# Patient Record
Sex: FEMALE | Race: WHITE | NOT HISPANIC OR LATINO | Employment: OTHER | URBAN - METROPOLITAN AREA
[De-identification: names, ages, dates, MRNs, and addresses within clinical notes are randomized per-mention and may not be internally consistent; named-entity substitution may affect disease eponyms.]

---

## 2020-02-03 ENCOUNTER — APPOINTMENT (OUTPATIENT)
Dept: GENERAL RADIOLOGY | Facility: CLINIC | Age: 75
DRG: 287 | End: 2020-02-03
Attending: EMERGENCY MEDICINE
Payer: COMMERCIAL

## 2020-02-03 ENCOUNTER — APPOINTMENT (OUTPATIENT)
Dept: GENERAL RADIOLOGY | Facility: CLINIC | Age: 75
DRG: 287 | End: 2020-02-03
Attending: INTERNAL MEDICINE
Payer: COMMERCIAL

## 2020-02-03 ENCOUNTER — HOSPITAL ENCOUNTER (INPATIENT)
Facility: CLINIC | Age: 75
LOS: 1 days | Discharge: HOME | DRG: 287 | End: 2020-02-04
Attending: EMERGENCY MEDICINE | Admitting: INTERNAL MEDICINE
Payer: COMMERCIAL

## 2020-02-03 ENCOUNTER — TRANSFERRED RECORDS (OUTPATIENT)
Dept: HEALTH INFORMATION MANAGEMENT | Facility: CLINIC | Age: 75
End: 2020-02-03

## 2020-02-03 ENCOUNTER — APPOINTMENT (OUTPATIENT)
Dept: CARDIOLOGY | Facility: CLINIC | Age: 75
DRG: 287 | End: 2020-02-03
Attending: INTERNAL MEDICINE
Payer: COMMERCIAL

## 2020-02-03 ENCOUNTER — APPOINTMENT (OUTPATIENT)
Dept: ULTRASOUND IMAGING | Facility: CLINIC | Age: 75
DRG: 287 | End: 2020-02-03
Attending: INTERNAL MEDICINE
Payer: COMMERCIAL

## 2020-02-03 ENCOUNTER — APPOINTMENT (OUTPATIENT)
Dept: CT IMAGING | Facility: CLINIC | Age: 75
DRG: 287 | End: 2020-02-03
Attending: EMERGENCY MEDICINE
Payer: COMMERCIAL

## 2020-02-03 ENCOUNTER — SURGERY (OUTPATIENT)
Age: 75
End: 2020-02-03
Payer: COMMERCIAL

## 2020-02-03 DIAGNOSIS — R07.9 CHEST PAIN: ICD-10-CM

## 2020-02-03 DIAGNOSIS — I48.19 PERSISTENT ATRIAL FIBRILLATION WITH RVR (H): ICD-10-CM

## 2020-02-03 DIAGNOSIS — I21.3 ST ELEVATION MYOCARDIAL INFARCTION (STEMI), UNSPECIFIED ARTERY (H): ICD-10-CM

## 2020-02-03 DIAGNOSIS — I48.0 PAROXYSMAL ATRIAL FIBRILLATION (H): Primary | ICD-10-CM

## 2020-02-03 PROBLEM — I48.91 A-FIB (H): Status: ACTIVE | Noted: 2020-02-03

## 2020-02-03 LAB
ANION GAP SERPL CALCULATED.3IONS-SCNC: 13 MMOL/L (ref 3–14)
ANION GAP SERPL CALCULATED.3IONS-SCNC: 7 MMOL/L (ref 3–14)
APTT PPP: 184 SEC (ref 22–37)
APTT PPP: 46 SEC (ref 22–37)
APTT PPP: 77 SEC (ref 22–37)
BASOPHILS # BLD AUTO: 0.1 10E9/L (ref 0–0.2)
BASOPHILS NFR BLD AUTO: 0.9 %
BUN SERPL-MCNC: 16 MG/DL (ref 7–30)
BUN SERPL-MCNC: 21 MG/DL (ref 7–30)
CALCIUM SERPL-MCNC: 7.5 MG/DL (ref 8.5–10.1)
CALCIUM SERPL-MCNC: 8.6 MG/DL (ref 8.5–10.1)
CHLORIDE SERPL-SCNC: 111 MMOL/L (ref 94–109)
CHLORIDE SERPL-SCNC: 117 MMOL/L (ref 94–109)
CO2 SERPL-SCNC: 16 MMOL/L (ref 20–32)
CO2 SERPL-SCNC: 22 MMOL/L (ref 20–32)
CREAT SERPL-MCNC: 0.79 MG/DL (ref 0.52–1.04)
CREAT SERPL-MCNC: 1.13 MG/DL (ref 0.52–1.04)
DIFFERENTIAL METHOD BLD: ABNORMAL
EOSINOPHIL # BLD AUTO: 0.2 10E9/L (ref 0–0.7)
EOSINOPHIL NFR BLD AUTO: 2.5 %
ERYTHROCYTE [DISTWIDTH] IN BLOOD BY AUTOMATED COUNT: 14.8 % (ref 10–15)
ERYTHROCYTE [DISTWIDTH] IN BLOOD BY AUTOMATED COUNT: 14.9 % (ref 10–15)
ERYTHROCYTE [DISTWIDTH] IN BLOOD BY AUTOMATED COUNT: 15.1 % (ref 10–15)
GFR SERPL CREATININE-BSD FRML MDRD: 48 ML/MIN/{1.73_M2}
GFR SERPL CREATININE-BSD FRML MDRD: 73 ML/MIN/{1.73_M2}
GLUCOSE BLDC GLUCOMTR-MCNC: 116 MG/DL (ref 70–99)
GLUCOSE BLDC GLUCOMTR-MCNC: 150 MG/DL (ref 70–99)
GLUCOSE BLDC GLUCOMTR-MCNC: 84 MG/DL (ref 70–99)
GLUCOSE SERPL-MCNC: 110 MG/DL (ref 70–99)
GLUCOSE SERPL-MCNC: 333 MG/DL (ref 70–99)
HBA1C MFR BLD: 5.9 % (ref 0–5.6)
HCT VFR BLD AUTO: 40.9 % (ref 35–47)
HCT VFR BLD AUTO: 42.1 % (ref 35–47)
HCT VFR BLD AUTO: 44.3 % (ref 35–47)
HGB BLD-MCNC: 13 G/DL (ref 11.7–15.7)
HGB BLD-MCNC: 13.6 G/DL (ref 11.7–15.7)
HGB BLD-MCNC: 14.4 G/DL (ref 11.7–15.7)
IMM GRANULOCYTES # BLD: 0 10E9/L (ref 0–0.4)
IMM GRANULOCYTES NFR BLD: 0.3 %
LYMPHOCYTES # BLD AUTO: 1.7 10E9/L (ref 0.8–5.3)
LYMPHOCYTES NFR BLD AUTO: 24.8 %
MCH RBC QN AUTO: 30.4 PG (ref 26.5–33)
MCH RBC QN AUTO: 31 PG (ref 26.5–33)
MCH RBC QN AUTO: 31.2 PG (ref 26.5–33)
MCHC RBC AUTO-ENTMCNC: 31.8 G/DL (ref 31.5–36.5)
MCHC RBC AUTO-ENTMCNC: 32.3 G/DL (ref 31.5–36.5)
MCHC RBC AUTO-ENTMCNC: 32.5 G/DL (ref 31.5–36.5)
MCV RBC AUTO: 96 FL (ref 78–100)
MCV RBC AUTO: 96 FL (ref 78–100)
MCV RBC AUTO: 97 FL (ref 78–100)
MONOCYTES # BLD AUTO: 0.4 10E9/L (ref 0–1.3)
MONOCYTES NFR BLD AUTO: 6.2 %
MRSA DNA SPEC QL NAA+PROBE: NEGATIVE
NEUTROPHILS # BLD AUTO: 4.5 10E9/L (ref 1.6–8.3)
NEUTROPHILS NFR BLD AUTO: 65.3 %
NRBC # BLD AUTO: 0 10*3/UL
NRBC BLD AUTO-RTO: 0 /100
NT-PROBNP SERPL-MCNC: 872 PG/ML (ref 0–900)
PLATELET # BLD AUTO: 101 10E9/L (ref 150–450)
PLATELET # BLD AUTO: 139 10E9/L (ref 150–450)
PLATELET # BLD AUTO: 93 10E9/L (ref 150–450)
POTASSIUM SERPL-SCNC: 3.9 MMOL/L (ref 3.4–5.3)
POTASSIUM SERPL-SCNC: 4 MMOL/L (ref 3.4–5.3)
RADIOLOGIST FLAGS: ABNORMAL
RBC # BLD AUTO: 4.28 10E12/L (ref 3.8–5.2)
RBC # BLD AUTO: 4.36 10E12/L (ref 3.8–5.2)
RBC # BLD AUTO: 4.64 10E12/L (ref 3.8–5.2)
SODIUM SERPL-SCNC: 140 MMOL/L (ref 133–144)
SODIUM SERPL-SCNC: 146 MMOL/L (ref 133–144)
SPECIMEN SOURCE: NORMAL
TROPONIN I BLD-MCNC: 0 UG/L (ref 0–0.08)
TROPONIN I BLD-MCNC: 0.01 UG/L (ref 0–0.08)
TROPONIN I SERPL-MCNC: <0.015 UG/L (ref 0–0.04)
TSH SERPL DL<=0.005 MIU/L-ACNC: 2.66 MU/L (ref 0.4–4)
WBC # BLD AUTO: 10.2 10E9/L (ref 4–11)
WBC # BLD AUTO: 6.8 10E9/L (ref 4–11)
WBC # BLD AUTO: 8.3 10E9/L (ref 4–11)

## 2020-02-03 PROCEDURE — 70450 CT HEAD/BRAIN W/O DYE: CPT

## 2020-02-03 PROCEDURE — 93308 TTE F-UP OR LMTD: CPT | Mod: 26 | Performed by: INTERNAL MEDICINE

## 2020-02-03 PROCEDURE — 93308 TTE F-UP OR LMTD: CPT

## 2020-02-03 PROCEDURE — 93005 ELECTROCARDIOGRAM TRACING: CPT

## 2020-02-03 PROCEDURE — 85730 THROMBOPLASTIN TIME PARTIAL: CPT | Performed by: INTERNAL MEDICINE

## 2020-02-03 PROCEDURE — 25800030 ZZH RX IP 258 OP 636: Performed by: INTERNAL MEDICINE

## 2020-02-03 PROCEDURE — 76857 US EXAM PELVIC LIMITED: CPT

## 2020-02-03 PROCEDURE — 84484 ASSAY OF TROPONIN QUANT: CPT

## 2020-02-03 PROCEDURE — 93321 DOPPLER ECHO F-UP/LMTD STD: CPT | Mod: 26 | Performed by: INTERNAL MEDICINE

## 2020-02-03 PROCEDURE — 25000125 ZZHC RX 250: Performed by: INTERNAL MEDICINE

## 2020-02-03 PROCEDURE — 85025 COMPLETE CBC W/AUTO DIFF WBC: CPT | Performed by: EMERGENCY MEDICINE

## 2020-02-03 PROCEDURE — 80048 BASIC METABOLIC PNL TOTAL CA: CPT | Performed by: EMERGENCY MEDICINE

## 2020-02-03 PROCEDURE — 83880 ASSAY OF NATRIURETIC PEPTIDE: CPT | Performed by: EMERGENCY MEDICINE

## 2020-02-03 PROCEDURE — 94002 VENT MGMT INPAT INIT DAY: CPT

## 2020-02-03 PROCEDURE — 25000128 H RX IP 250 OP 636: Performed by: INTERNAL MEDICINE

## 2020-02-03 PROCEDURE — 25000128 H RX IP 250 OP 636: Performed by: EMERGENCY MEDICINE

## 2020-02-03 PROCEDURE — 84484 ASSAY OF TROPONIN QUANT: CPT | Performed by: EMERGENCY MEDICINE

## 2020-02-03 PROCEDURE — 4A023N7 MEASUREMENT OF CARDIAC SAMPLING AND PRESSURE, LEFT HEART, PERCUTANEOUS APPROACH: ICD-10-PCS | Performed by: INTERNAL MEDICINE

## 2020-02-03 PROCEDURE — 00000146 ZZHCL STATISTIC GLUCOSE BY METER IP

## 2020-02-03 PROCEDURE — 36620 INSERTION CATHETER ARTERY: CPT

## 2020-02-03 PROCEDURE — 80048 BASIC METABOLIC PNL TOTAL CA: CPT | Performed by: OBSTETRICS & GYNECOLOGY

## 2020-02-03 PROCEDURE — 99152 MOD SED SAME PHYS/QHP 5/>YRS: CPT | Performed by: INTERNAL MEDICINE

## 2020-02-03 PROCEDURE — 85027 COMPLETE CBC AUTOMATED: CPT | Performed by: OBSTETRICS & GYNECOLOGY

## 2020-02-03 PROCEDURE — 96365 THER/PROPH/DIAG IV INF INIT: CPT | Mod: 59

## 2020-02-03 PROCEDURE — 40000965 ZZH STATISTIC END TITIAL CO2 MONITORING

## 2020-02-03 PROCEDURE — 96376 TX/PRO/DX INJ SAME DRUG ADON: CPT

## 2020-02-03 PROCEDURE — 87641 MR-STAPH DNA AMP PROBE: CPT | Performed by: INTERNAL MEDICINE

## 2020-02-03 PROCEDURE — 25000128 H RX IP 250 OP 636

## 2020-02-03 PROCEDURE — B2111ZZ FLUOROSCOPY OF MULTIPLE CORONARY ARTERIES USING LOW OSMOLAR CONTRAST: ICD-10-PCS | Performed by: INTERNAL MEDICINE

## 2020-02-03 PROCEDURE — 87640 STAPH A DNA AMP PROBE: CPT | Performed by: INTERNAL MEDICINE

## 2020-02-03 PROCEDURE — 99292 CRITICAL CARE ADDL 30 MIN: CPT

## 2020-02-03 PROCEDURE — 84443 ASSAY THYROID STIM HORMONE: CPT | Performed by: INTERNAL MEDICINE

## 2020-02-03 PROCEDURE — 25000131 ZZH RX MED GY IP 250 OP 636 PS 637: Performed by: INTERNAL MEDICINE

## 2020-02-03 PROCEDURE — 31500 INSERT EMERGENCY AIRWAY: CPT

## 2020-02-03 PROCEDURE — 5A1945Z RESPIRATORY VENTILATION, 24-96 CONSECUTIVE HOURS: ICD-10-PCS | Performed by: EMERGENCY MEDICINE

## 2020-02-03 PROCEDURE — 40000986 XR CHEST PORT 1 VW

## 2020-02-03 PROCEDURE — 93458 L HRT ARTERY/VENTRICLE ANGIO: CPT | Mod: 26 | Performed by: INTERNAL MEDICINE

## 2020-02-03 PROCEDURE — 85027 COMPLETE CBC AUTOMATED: CPT | Performed by: INTERNAL MEDICINE

## 2020-02-03 PROCEDURE — 99291 CRITICAL CARE FIRST HOUR: CPT | Mod: 25 | Performed by: INTERNAL MEDICINE

## 2020-02-03 PROCEDURE — C1769 GUIDE WIRE: HCPCS | Performed by: INTERNAL MEDICINE

## 2020-02-03 PROCEDURE — 71045 X-RAY EXAM CHEST 1 VIEW: CPT | Mod: 76

## 2020-02-03 PROCEDURE — 99291 CRITICAL CARE FIRST HOUR: CPT

## 2020-02-03 PROCEDURE — 40000275 ZZH STATISTIC RCP TIME EA 10 MIN

## 2020-02-03 PROCEDURE — 99153 MOD SED SAME PHYS/QHP EA: CPT

## 2020-02-03 PROCEDURE — 71260 CT THORAX DX C+: CPT

## 2020-02-03 PROCEDURE — 99223 1ST HOSP IP/OBS HIGH 75: CPT | Mod: AI | Performed by: INTERNAL MEDICINE

## 2020-02-03 PROCEDURE — 40000281 ZZH STATISTIC TRANSPORT TIME EA 15 MIN

## 2020-02-03 PROCEDURE — 93458 L HRT ARTERY/VENTRICLE ANGIO: CPT | Performed by: INTERNAL MEDICINE

## 2020-02-03 PROCEDURE — 76604 US EXAM CHEST: CPT

## 2020-02-03 PROCEDURE — 20000003 ZZH R&B ICU

## 2020-02-03 PROCEDURE — 27210794 ZZH OR GENERAL SUPPLY STERILE: Performed by: INTERNAL MEDICINE

## 2020-02-03 PROCEDURE — 27210131 ZZH KIT ART LINE INSERTION

## 2020-02-03 PROCEDURE — 25000125 ZZHC RX 250: Performed by: EMERGENCY MEDICINE

## 2020-02-03 PROCEDURE — 99152 MOD SED SAME PHYS/QHP 5/>YRS: CPT

## 2020-02-03 PROCEDURE — 76705 ECHO EXAM OF ABDOMEN: CPT

## 2020-02-03 PROCEDURE — 93325 DOPPLER ECHO COLOR FLOW MAPG: CPT | Mod: 26 | Performed by: INTERNAL MEDICINE

## 2020-02-03 PROCEDURE — 93970 EXTREMITY STUDY: CPT

## 2020-02-03 PROCEDURE — 71045 X-RAY EXAM CHEST 1 VIEW: CPT

## 2020-02-03 PROCEDURE — 83036 HEMOGLOBIN GLYCOSYLATED A1C: CPT | Performed by: INTERNAL MEDICINE

## 2020-02-03 PROCEDURE — 96375 TX/PRO/DX INJ NEW DRUG ADDON: CPT

## 2020-02-03 RX ORDER — FENTANYL CITRATE 50 UG/ML
25-50 INJECTION, SOLUTION INTRAMUSCULAR; INTRAVENOUS
Status: DISPENSED | OUTPATIENT
Start: 2020-02-03 | End: 2020-02-04

## 2020-02-03 RX ORDER — FLUMAZENIL 0.1 MG/ML
0.2 INJECTION, SOLUTION INTRAVENOUS
Status: DISCONTINUED | OUTPATIENT
Start: 2020-02-03 | End: 2020-02-03

## 2020-02-03 RX ORDER — NITROGLYCERIN 5 MG/ML
VIAL (ML) INTRAVENOUS
Status: DISCONTINUED | OUTPATIENT
Start: 2020-02-03 | End: 2020-02-03 | Stop reason: HOSPADM

## 2020-02-03 RX ORDER — NALOXONE HYDROCHLORIDE 0.4 MG/ML
.1-.4 INJECTION, SOLUTION INTRAMUSCULAR; INTRAVENOUS; SUBCUTANEOUS
Status: DISCONTINUED | OUTPATIENT
Start: 2020-02-03 | End: 2020-02-04

## 2020-02-03 RX ORDER — NICOTINE POLACRILEX 4 MG
15-30 LOZENGE BUCCAL
Status: DISCONTINUED | OUTPATIENT
Start: 2020-02-03 | End: 2020-02-04 | Stop reason: HOSPADM

## 2020-02-03 RX ORDER — FENTANYL CITRATE 50 UG/ML
25 INJECTION, SOLUTION INTRAMUSCULAR; INTRAVENOUS
Status: DISCONTINUED | OUTPATIENT
Start: 2020-02-03 | End: 2020-02-03

## 2020-02-03 RX ORDER — KETAMINE HCL IN 0.9 % NACL 50 MG/5 ML
200 SYRINGE (ML) INTRAVENOUS ONCE
Status: COMPLETED | OUTPATIENT
Start: 2020-02-03 | End: 2020-02-03

## 2020-02-03 RX ORDER — FENTANYL CITRATE 50 UG/ML
INJECTION, SOLUTION INTRAMUSCULAR; INTRAVENOUS
Status: COMPLETED
Start: 2020-02-03 | End: 2020-02-03

## 2020-02-03 RX ORDER — PROPOFOL 10 MG/ML
10-20 INJECTION, EMULSION INTRAVENOUS EVERY 30 MIN PRN
Status: DISCONTINUED | OUTPATIENT
Start: 2020-02-03 | End: 2020-02-04 | Stop reason: HOSPADM

## 2020-02-03 RX ORDER — NALOXONE HYDROCHLORIDE 0.4 MG/ML
.2-.4 INJECTION, SOLUTION INTRAMUSCULAR; INTRAVENOUS; SUBCUTANEOUS
Status: ACTIVE | OUTPATIENT
Start: 2020-02-03 | End: 2020-02-04

## 2020-02-03 RX ORDER — BISACODYL 10 MG
10 SUPPOSITORY, RECTAL RECTAL DAILY PRN
Status: DISCONTINUED | OUTPATIENT
Start: 2020-02-03 | End: 2020-02-04 | Stop reason: HOSPADM

## 2020-02-03 RX ORDER — ASPIRIN 300 MG/1
300 SUPPOSITORY RECTAL ONCE
Status: DISCONTINUED | OUTPATIENT
Start: 2020-02-03 | End: 2020-02-03

## 2020-02-03 RX ORDER — ATROPINE SULFATE 0.1 MG/ML
INJECTION INTRAVENOUS
Status: DISCONTINUED | OUTPATIENT
Start: 2020-02-03 | End: 2020-02-03 | Stop reason: HOSPADM

## 2020-02-03 RX ORDER — AMOXICILLIN 250 MG
2 CAPSULE ORAL 2 TIMES DAILY PRN
Status: DISCONTINUED | OUTPATIENT
Start: 2020-02-03 | End: 2020-02-04 | Stop reason: HOSPADM

## 2020-02-03 RX ORDER — HEPARIN SODIUM 1000 [USP'U]/ML
INJECTION, SOLUTION INTRAVENOUS; SUBCUTANEOUS
Status: COMPLETED
Start: 2020-02-03 | End: 2020-02-03

## 2020-02-03 RX ORDER — LIDOCAINE HYDROCHLORIDE 10 MG/ML
INJECTION, SOLUTION EPIDURAL; INFILTRATION; INTRACAUDAL; PERINEURAL
Status: DISCONTINUED
Start: 2020-02-03 | End: 2020-02-03 | Stop reason: HOSPADM

## 2020-02-03 RX ORDER — PROCHLORPERAZINE 25 MG
12.5 SUPPOSITORY, RECTAL RECTAL EVERY 12 HOURS PRN
Status: DISCONTINUED | OUTPATIENT
Start: 2020-02-03 | End: 2020-02-04 | Stop reason: HOSPADM

## 2020-02-03 RX ORDER — IOPAMIDOL 755 MG/ML
82 INJECTION, SOLUTION INTRAVASCULAR ONCE
Status: COMPLETED | OUTPATIENT
Start: 2020-02-03 | End: 2020-02-03

## 2020-02-03 RX ORDER — HEPARIN SODIUM 1000 [USP'U]/ML
5000 INJECTION, SOLUTION INTRAVENOUS; SUBCUTANEOUS ONCE
Status: COMPLETED | OUTPATIENT
Start: 2020-02-03 | End: 2020-02-03

## 2020-02-03 RX ORDER — DEXTROSE MONOHYDRATE 25 G/50ML
25-50 INJECTION, SOLUTION INTRAVENOUS
Status: DISCONTINUED | OUTPATIENT
Start: 2020-02-03 | End: 2020-02-04 | Stop reason: HOSPADM

## 2020-02-03 RX ORDER — FENTANYL CITRATE 50 UG/ML
INJECTION, SOLUTION INTRAMUSCULAR; INTRAVENOUS
Status: DISCONTINUED
Start: 2020-02-03 | End: 2020-02-03 | Stop reason: HOSPADM

## 2020-02-03 RX ORDER — HEPARIN SODIUM 10000 [USP'U]/100ML
1050 INJECTION, SOLUTION INTRAVENOUS CONTINUOUS
Status: DISCONTINUED | OUTPATIENT
Start: 2020-02-03 | End: 2020-02-04

## 2020-02-03 RX ORDER — ACETAMINOPHEN 325 MG/1
650 TABLET ORAL EVERY 4 HOURS PRN
Status: DISCONTINUED | OUTPATIENT
Start: 2020-02-03 | End: 2020-02-04 | Stop reason: HOSPADM

## 2020-02-03 RX ORDER — ONDANSETRON 2 MG/ML
4 INJECTION INTRAMUSCULAR; INTRAVENOUS EVERY 6 HOURS PRN
Status: DISCONTINUED | OUTPATIENT
Start: 2020-02-03 | End: 2020-02-04 | Stop reason: HOSPADM

## 2020-02-03 RX ORDER — ONDANSETRON 4 MG/1
4 TABLET, ORALLY DISINTEGRATING ORAL EVERY 6 HOURS PRN
Status: DISCONTINUED | OUTPATIENT
Start: 2020-02-03 | End: 2020-02-04 | Stop reason: HOSPADM

## 2020-02-03 RX ORDER — NALOXONE HYDROCHLORIDE 0.4 MG/ML
.1-.4 INJECTION, SOLUTION INTRAMUSCULAR; INTRAVENOUS; SUBCUTANEOUS
Status: DISCONTINUED | OUTPATIENT
Start: 2020-02-03 | End: 2020-02-04 | Stop reason: HOSPADM

## 2020-02-03 RX ORDER — METOPROLOL TARTRATE 1 MG/ML
2.5 INJECTION, SOLUTION INTRAVENOUS EVERY 6 HOURS
Status: DISCONTINUED | OUTPATIENT
Start: 2020-02-03 | End: 2020-02-04 | Stop reason: HOSPADM

## 2020-02-03 RX ORDER — PHENYLEPHRINE HYDROCHLORIDE 10 MG/ML
INJECTION INTRAVENOUS
Status: DISCONTINUED | OUTPATIENT
Start: 2020-02-03 | End: 2020-02-03 | Stop reason: HOSPADM

## 2020-02-03 RX ORDER — PHENYLEPHRINE HCL IN 0.9% NACL 1 MG/10 ML
SYRINGE (ML) INTRAVENOUS
Status: DISCONTINUED
Start: 2020-02-03 | End: 2020-02-03 | Stop reason: HOSPADM

## 2020-02-03 RX ORDER — ATROPINE SULFATE 0.1 MG/ML
0.5 INJECTION INTRAVENOUS EVERY 5 MIN PRN
Status: ACTIVE | OUTPATIENT
Start: 2020-02-03 | End: 2020-02-04

## 2020-02-03 RX ORDER — DOPAMINE HYDROCHLORIDE 160 MG/100ML
INJECTION, SOLUTION INTRAVENOUS
Status: DISCONTINUED
Start: 2020-02-03 | End: 2020-02-03 | Stop reason: HOSPADM

## 2020-02-03 RX ORDER — PROPOFOL 10 MG/ML
INJECTION, EMULSION INTRAVENOUS
Status: DISCONTINUED
Start: 2020-02-03 | End: 2020-02-03

## 2020-02-03 RX ORDER — PROCHLORPERAZINE MALEATE 5 MG
5 TABLET ORAL EVERY 6 HOURS PRN
Status: DISCONTINUED | OUTPATIENT
Start: 2020-02-03 | End: 2020-02-04 | Stop reason: HOSPADM

## 2020-02-03 RX ORDER — SODIUM CHLORIDE 9 MG/ML
INJECTION, SOLUTION INTRAVENOUS CONTINUOUS
Status: DISCONTINUED | OUTPATIENT
Start: 2020-02-03 | End: 2020-02-04

## 2020-02-03 RX ORDER — AMOXICILLIN 250 MG
1 CAPSULE ORAL 2 TIMES DAILY PRN
Status: DISCONTINUED | OUTPATIENT
Start: 2020-02-03 | End: 2020-02-04 | Stop reason: HOSPADM

## 2020-02-03 RX ORDER — PHENYLEPHRINE HYDROCHLORIDE 10 MG/ML
INJECTION INTRAVENOUS
Status: DISCONTINUED
Start: 2020-02-03 | End: 2020-02-03 | Stop reason: HOSPADM

## 2020-02-03 RX ORDER — PROPOFOL 10 MG/ML
5-75 INJECTION, EMULSION INTRAVENOUS CONTINUOUS
Status: DISCONTINUED | OUTPATIENT
Start: 2020-02-03 | End: 2020-02-04 | Stop reason: HOSPADM

## 2020-02-03 RX ORDER — KETAMINE HYDROCHLORIDE 100 MG/ML
INJECTION, SOLUTION INTRAMUSCULAR; INTRAVENOUS
Status: DISCONTINUED
Start: 2020-02-03 | End: 2020-02-03 | Stop reason: WASHOUT

## 2020-02-03 RX ORDER — ONDANSETRON 2 MG/ML
INJECTION INTRAMUSCULAR; INTRAVENOUS
Status: COMPLETED
Start: 2020-02-03 | End: 2020-02-03

## 2020-02-03 RX ORDER — NITROGLYCERIN 5 MG/ML
VIAL (ML) INTRAVENOUS
Status: DISCONTINUED
Start: 2020-02-03 | End: 2020-02-03 | Stop reason: HOSPADM

## 2020-02-03 RX ORDER — FENTANYL CITRATE 50 UG/ML
INJECTION, SOLUTION INTRAMUSCULAR; INTRAVENOUS
Status: DISCONTINUED | OUTPATIENT
Start: 2020-02-03 | End: 2020-02-03 | Stop reason: HOSPADM

## 2020-02-03 RX ORDER — DOPAMINE HYDROCHLORIDE 160 MG/100ML
5-20 INJECTION, SOLUTION INTRAVENOUS CONTINUOUS
Status: DISCONTINUED | OUTPATIENT
Start: 2020-02-03 | End: 2020-02-04

## 2020-02-03 RX ORDER — FENTANYL CITRATE 50 UG/ML
50 INJECTION, SOLUTION INTRAMUSCULAR; INTRAVENOUS ONCE
Status: COMPLETED | OUTPATIENT
Start: 2020-02-03 | End: 2020-02-03

## 2020-02-03 RX ORDER — ATROPINE SULFATE 0.1 MG/ML
INJECTION INTRAVENOUS
Status: DISCONTINUED
Start: 2020-02-03 | End: 2020-02-03 | Stop reason: WASHOUT

## 2020-02-03 RX ORDER — VERAPAMIL HYDROCHLORIDE 2.5 MG/ML
INJECTION, SOLUTION INTRAVENOUS
Status: DISCONTINUED
Start: 2020-02-03 | End: 2020-02-03 | Stop reason: WASHOUT

## 2020-02-03 RX ORDER — IOPAMIDOL 755 MG/ML
INJECTION, SOLUTION INTRAVASCULAR
Status: DISCONTINUED | OUTPATIENT
Start: 2020-02-03 | End: 2020-02-03 | Stop reason: HOSPADM

## 2020-02-03 RX ADMIN — MIDAZOLAM 5 MG: 1 INJECTION INTRAMUSCULAR; INTRAVENOUS at 12:33

## 2020-02-03 RX ADMIN — DOPAMINE HYDROCHLORIDE 5 MCG/KG/MIN: 160 INJECTION, SOLUTION INTRAVENOUS at 21:52

## 2020-02-03 RX ADMIN — HEPARIN SODIUM 5000 UNITS: 1000 INJECTION INTRAVENOUS; SUBCUTANEOUS at 12:11

## 2020-02-03 RX ADMIN — EPINEPHRINE 1 MG: 0.1 INJECTION INTRACARDIAC; INTRAVENOUS at 12:22

## 2020-02-03 RX ADMIN — IOPAMIDOL 82 ML: 755 INJECTION, SOLUTION INTRAVENOUS at 11:48

## 2020-02-03 RX ADMIN — SUCCINYLCHOLINE CHLORIDE 120 MG: 20 INJECTION, SOLUTION INTRAMUSCULAR; INTRAVENOUS at 12:24

## 2020-02-03 RX ADMIN — METOPROLOL TARTRATE 2.5 MG: 5 INJECTION INTRAVENOUS at 18:19

## 2020-02-03 RX ADMIN — FENTANYL CITRATE 50 MCG: 50 INJECTION INTRAMUSCULAR; INTRAVENOUS at 12:33

## 2020-02-03 RX ADMIN — ONDANSETRON HYDROCHLORIDE 8 MG: 2 INJECTION, SOLUTION INTRAMUSCULAR; INTRAVENOUS at 11:14

## 2020-02-03 RX ADMIN — HEPARIN SODIUM 5000 UNITS: 1000 INJECTION, SOLUTION INTRAVENOUS; SUBCUTANEOUS at 12:11

## 2020-02-03 RX ADMIN — DOPAMINE HYDROCHLORIDE 5 MCG/KG/MIN: 160 INJECTION, SOLUTION INTRAVENOUS at 12:07

## 2020-02-03 RX ADMIN — HEPARIN SODIUM 1050 UNITS/HR: 10000 INJECTION, SOLUTION INTRAVENOUS at 22:16

## 2020-02-03 RX ADMIN — KETAMINE HYDROCHLORIDE 200 MG: 10 INJECTION, SOLUTION INTRAMUSCULAR; INTRAVENOUS at 12:24

## 2020-02-03 RX ADMIN — SODIUM CHLORIDE: 9 INJECTION, SOLUTION INTRAVENOUS at 17:27

## 2020-02-03 RX ADMIN — SODIUM CHLORIDE: 9 INJECTION, SOLUTION INTRAVENOUS at 21:51

## 2020-02-03 RX ADMIN — PROPOFOL 35 MCG/KG/MIN: 10 INJECTION, EMULSION INTRAVENOUS at 21:18

## 2020-02-03 RX ADMIN — INSULIN ASPART 1 UNITS: 100 INJECTION, SOLUTION INTRAVENOUS; SUBCUTANEOUS at 16:31

## 2020-02-03 RX ADMIN — DOPAMINE HYDROCHLORIDE 5 MCG/KG/MIN: 160 INJECTION, SOLUTION INTRAVENOUS at 20:21

## 2020-02-03 RX ADMIN — FENTANYL CITRATE 50 MCG: 50 INJECTION, SOLUTION INTRAMUSCULAR; INTRAVENOUS at 19:35

## 2020-02-03 RX ADMIN — FENTANYL CITRATE 25 MCG: 50 INJECTION, SOLUTION INTRAMUSCULAR; INTRAVENOUS at 11:20

## 2020-02-03 RX ADMIN — MIDAZOLAM 0.5 MG: 1 INJECTION INTRAMUSCULAR; INTRAVENOUS at 19:33

## 2020-02-03 SDOH — HEALTH STABILITY: MENTAL HEALTH: HOW OFTEN DO YOU HAVE A DRINK CONTAINING ALCOHOL?: 2-3 TIMES A WEEK

## 2020-02-03 ASSESSMENT — ENCOUNTER SYMPTOMS
SHORTNESS OF BREATH: 1
ABDOMINAL PAIN: 0
NECK PAIN: 1
APPETITE CHANGE: 0
HEADACHES: 1
DIAPHORESIS: 1
PALPITATIONS: 0
BACK PAIN: 0

## 2020-02-03 ASSESSMENT — ACTIVITIES OF DAILY LIVING (ADL)
ADLS_ACUITY_SCORE: 16
ADLS_ACUITY_SCORE: 16

## 2020-02-03 NOTE — PROGRESS NOTES
[facility] ED VENTILATOR RESPIRATORY NOTE  Date of Admission: 02/03/2020   Date of Intubation (most recent): 02/03/2020  Reason for Mechanical Ventilation: airway protection  Number of Days on Mechanical Ventilation: 1  Plan:  Wean as tolerated    Ventilation Mode: CMV/AC  (Continuous Mandatory Ventilation/ Assist Control)  FiO2 (%): 100 %  Rate Set (breaths/minute): 14 breaths/min  Tidal Volume Set (mL): 450 mL  PEEP (cm H2O): 5 cmH2O  Oxygen Concentration (%): 100 %  Resp: 14    Transported to cath lab and straight to ICU rm 360

## 2020-02-03 NOTE — ED PROVIDER NOTES
Narritive: Arterial Line Insertion      INDICATION: Continuous blood pressure monitoring    ANESTHESIA:  1% lidocaine    CONSENT:   Implied, and preformed in emergent situation     Universal protocol was followed. TIME OUT conducted just prior to    starting the procedure confirmed patient identity, site/side, procedure,    patient position, abd availability of correct equipment and implants. Yes    The skin overlying the right radial artery was prepped and draped in a sterile fashion. The artery was entered with a finder needle through which a guidewire was inserted. The needle was then removed and a 5 cm arterial cannula was inserted over the guidewire without difficulty. The guidewire was removed and the catheter was sutured to the underlying skin. The patient tolerated the procedure well and there were no immediate complications.    PATIENT STATUS: The patient tolerated the procedure well and there were no complications.      Denis Leach MD  \[facility]\""  [facility]     Denis Leach MD  02/03/20 [id number]

## 2020-02-03 NOTE — H&P
History and Physical     Paty Grajeda MRN# [id number]   YOB: 1945 Age: 74 year old      Date of Admission:  2/3/2020    Primary care provider: [facility], [place]          Assessment and Plan:     Summary of Stay: Paty Grajeda is a 74 year old female with a history of htn/hlp, impaired fasting glucose, chronic thrombocytopenia with platelets in the low 100's, herpes keratitis (who I believe is on suppressive acyclovir), hypothyroidism who presented to the emergency room with shortness of breath and chest pain.  Symptoms started about an hour after she was participating in a water aerobics class.    She was seen in a urgent care at which time an EKG was completed showing A. fib with RVR and she was sent into our emergency room for further evaluation.  On arrival to the ER she became hypotensive, EKG showed some diffuse ST depression and A. fib with RVR.  She ended up becoming hypotensive causing respiratory distress and so was intubated.  Complicated by right mainstem intubation which is been corrected.      Given above, Cath Lab was activated for possible STEMI , and she was brought over for emergent angiography.  Limited echo showed preserved ejection fraction, and angiogram was negative for any obstructive lesions.  During the angiography she developed a junctional rhythm when trying to access the right coronary artery.  She was mildly hypotensive and initiated on dopamine.    She was admitted and brought up to the intensive care unit after her procedure intubated, on propofol, and dopamine.      Problem List:   1. A. fib with RVR: She spontaneously cardioverted and remains in normal sinus rhythm at this time.  Will check TSH, no significant valvular disease seen on limited echocardiogram.  Check complete echo.  No evidence of acute coronary syndrome.  Will need to inquire about drinking habits.  Unknown risk factors for possible PE, admission chest abdomen and pelvis CT completed but not  intervention that can rule out PE.  Will check Dopplers  tonight.  Hard to give additional contrast at this time after she has had IV contrast CAP CT on admission, and then subsequent coronary angiography.  Has evidence of pulmonary congestion likely from A. fib with RVR which will interfere with nuc med testing.  Empiric IV heparin overnight for A. fib.  Chads vasc 2 is 3 so clearly a candidate for anticoagulation.    2. Respiratory distress necessitating intubation in the ER.  Currently lungs are clear on exam, she is on mechanical ventilation.  Wean oxygen as able, weaning trial tonight and tomorrow in anticipation of extubation soon.  3. Hypotension: Secondary to A. fib with RVR although is persisting and now in part due to propofol, currently on low-dose dopamine at 5 mg,  4. Junctional rhythm: Precipitated during angiography: Resolved.  Currently in normal sinus rhythm.  Continue to monitor with telemetry.  5. Urinary retention: Was leaking urine on presentation to the ICU, Schumacher placed with over a liter out.  Leave Schumacher in place for now, trial of void tomorrow  6. History of a stress cardiomyopathy: On review of epic looks like it was felt to be induced by hypertensive urgency.  Apparently resolved by echocardiogram  7. Hypertension: PTA was on lisinopril 10 and metoprolol XL 25 mg p.o. twice daily per my review of epic, awaiting formal medication reconciliation.  Hypotension has resolved and she is off the dopamine drip.  We will add in low-dose metoprolol with parameters for holding in light of new diagnosis of A. fib with RVR although must be careful given recent junctional rhythm that was likely precipitated by the procedure  8. HLP: She apparently is on atorvastatin 20 mg daily, will continue on formal medication reconciliation has been completed  9. Hypothyroidism looks to be on levothyroxine at 75 mcg p.o. daily, would resume when medication reconciled  10. Impaired fasting glucose: Monitor with  insulin sliding scale, glucoses are elevated likely stress reaction  11. Known adrenal adenoma- monitored in the outpatient setting re-documented on admission CT scan  12. History of herpes keratitis: At least in the past had been on suppressive acyclovir, awaiting formal med rec to continue if still taking it  DVT Prophylaxis: Heparin drip  Code Status: Full Code  Functional Status: Independent  Schumacher: Placed for urinary retention  Access: 3 peripheral IVs              Chief Complaint:     Shortness of breath and chest pain       History of Present Illness:   Summary of Stay: Paty Grajeda is a 74 year old female with a history of htn/hlp, impaired fasting glucose, chronic thrombocytopenia with platelets in the low 100's, herpes keratitis (who I believe is on suppressive acyclovir), hypothyroidism who presented to the emergency room with shortness of breath and chest pain.  Symptoms started about an hour after she was participating in a water aerobics class.    She was seen in a urgent care at which time an EKG was completed showing A. fib with RVR and she was sent into our emergency room for further evaluation.  On arrival to the ER she became hypotensive, EKG showed some diffuse ST depression and A. fib with RVR.  She ended up becoming hypotensive causing respiratory distress and so was intubated.    Given above, Cath Lab was activated for possible STEMI , and she was brought over for emergent angiography.  Limited echo showed preserved ejection fraction, and angiogram was negative for any obstructive lesions.  During the angiography she developed a junctional rhythm when trying to access the right coronary artery.  She was mildly hypotensive and initiated on dopamine.    She was admitted and brought up to the intensive care unit after her procedure intubated, on propofol, and dopamine.    The history is obtained in discussion with Dr. Trevon Olivera of cardiology,  of interventional cardiology.  Care  everywhere has been extensively reviewed        Past Medical History:     Past Medical History:   Diagnosis Date     Benign essential hypertension      Benign positional vertigo     resovled with PT     Herpes keratitis      Hyperlipidemia      Hypothyroidism      Impaired fasting glucose      Peptic ulcer      Stress-induced cardiomyopathy     Resolved, felt due to hypertensive urgency     Thrombocytopenia (H)              Past Surgical History:     Past Surgical History:   Procedure Laterality Date     BREAST BIOPSY, RT/LT       CATARACT IOL, RT/LT       CV CORONARY ANGIOGRAM N/A 2/3/2020    Procedure: Coronary Angiogram;  Surgeon: Oni Villela MD;  Location:  HEART CARDIAC CATH LAB     CV LEFT HEART CATH N/A 2/3/2020    Procedure: Left Heart Cath;  Surgeon: Oni Villela MD;  Location:  HEART CARDIAC CATH LAB     ENT SURGERY       HYSTERECTOMY               Social History:     Social History     Tobacco Use     Smoking status: Former Smoker     Last attempt to quit:      Years since quittin.1     Smokeless tobacco: Never Used   Substance Use Topics     Alcohol use: Yes     Frequency: 2-3 times a week             Family History:     Family History   Problem Relation Age of Onset     Hypertension Mother      Lymphoma Mother      Colon Cancer Mother      Coronary Artery Disease Father      Abdominal Aortic Aneurysm Father      Hypertension Father             Allergies:   No Known Allergies          Medications:     Awaiting formal medication reconciliation, but per evaluation at her primary care physician in 2019 appears to be on the following    Lisinopril 10 mg p.o. daily  Metoprolol XL 25 mg p.o. twice daily  Atorvastatin 20 mg p.o. daily  Levothyroxine 75 mcg p.o. daily          Review of Systems:     A Comprehensive greater than 10 system review of systems was carried out.  Pertinent positives and negatives are noted above.  Otherwise negative for contributory information.            Physical Exam:   Blood pressure (!) 85/70, pulse 98, resp. rate 16, weight 68.4 kg (150 lb 12.7 oz), SpO2 97 %.  Exam:    General: Intubated and sedated  HEENT:  Head nc/at sclera clear PERRL O/P: ET tube in place  Lungs: cta b nl effort very good air movement, no wheezing or rales  CV:  RRR no m/r/g no le edema  Abd:  S/nt/nd no r/g  Neuro: Currently sedated on propofol  Alert and oriented affect appropriate   Skin:  W/d no c/c               Data:          Lab Results   Component Value Date     02/03/2020    Lab Results   Component Value Date    CHLORIDE 111 02/03/2020    Lab Results   Component Value Date    BUN 21 02/03/2020      Lab Results   Component Value Date    POTASSIUM 4.0 02/03/2020    Lab Results   Component Value Date    CO2 16 02/03/2020    Lab Results   Component Value Date    CR 1.13 02/03/2020        Lab Results   Component Value Date    NTBNPI 872 02/03/2020     Lab Results   Component Value Date    WBC 6.8 02/03/2020    HGB 14.4 02/03/2020    HCT 44.3 02/03/2020    MCV 96 02/03/2020     (L) 02/03/2020     Lab Results   Component Value Date     (H) 02/03/2020     Lab Results   Component Value Date    TROPONIN 0.01 02/03/2020    TROPI <0.015 02/03/2020            Imaging:     Recent Results (from the past 24 hour(s))   XR Chest Port 1 View    Narrative    CHEST ONE VIEW PORTABLE   2/3/2020 11:28 AM     HISTORY: Chest pain.    COMPARISON: 4/30/2016.      Impression    IMPRESSION: No airspace consolidation, pneumothorax, or pleural  effusion.    ILYA COOK MD   CT Chest/Abdomen/Pelvis w Contrast    Narrative    CT CHEST/ABDOMEN/PELVIS WITH CONTRAST   2/3/2020 11:50 AM     HISTORY: Chest and back pain, hypotension.    TECHNIQUE:  82mL Isovue-370. CT images of the chest, abdomen, and  pelvis after nonionic intravenous contrast. Radiation dose for this  scan was reduced using automated exposure control, adjustment of the  mA and/or kV according to patient size, or iterative  reconstruction  technique.    COMPARISON: 4/30/2016.    FINDINGS:     Chest: No evidence of acute thoracic aortic abnormality. No  pneumothorax. No pleural or pericardial effusion. No significant  airspace consolidation. Moderate interstitial thickening present along  with vascular congestion. No pleural or pericardial effusion. No  pneumothorax. No pulmonary nodule or mass. No thoracic or axillary  adenopathy.    Abdomen and pelvis: At the posterior margin of the RIGHT lobe of  liver, there is a 3.2 cm low-density lesion with discontinuous  peripheral lobular enhancement that is unchanged from prior study and  compatible with a hemangioma. No other liver lesions are demonstrated.  The gallbladder, spleen, pancreas, and RIGHT adrenal gland are  unremarkable. There is a 2.2 cm LEFT adrenal nodule that is unchanged  from prior. No hydronephrosis or evidence of solid renal mass. There  are numerous parapelvic cysts in the LEFT kidney. No abdominal or  retroperitoneal lymphadenopathy. Mild nonaneurysmal aortic  atherosclerosis. No pelvic lymphadenopathy, free fluid, or mass. The  uterus is absent. There is sigmoid diverticulosis without evidence of  acute diverticulitis. The appendix is normal.    No lytic or blastic bone lesions.      Impression    IMPRESSION:  1. Pulmonary interstitial thickening along with vascular congestion  suggestive of CHF.  2. Stable hemangioma in the RIGHT lobe of the liver.  3. Stable LEFT adrenal adenoma.    ILYA COOK MD   CT Head w/o Contrast    Narrative    CT SCAN OF THE HEAD WITHOUT CONTRAST   2/3/2020 11:52 AM     HISTORY: Headache.    TECHNIQUE:  Axial images of the head and coronal reformations without  IV contrast material. Radiation dose for this scan was reduced using  automated exposure control, adjustment of the mA and/or kV according  to patient size, or iterative reconstruction technique.    COMPARISON: None.    FINDINGS: The examination is mildly limited due to motion  artifact,  particularly at the region of the centrum semiovale and frontoparietal  vertex. The ventricles appear normal in size and configuration. There  is mild global brain parenchymal volume loss. Mild patchy  hypoattenuation in the periventricular and deep cerebral white matter  likely reflects chronic small vessel ischemic disease. No definite  acute intracranial hemorrhage, extra-axial fluid collection, mass  lesion, mass effect or shift/herniation. The basal cisterns are  patent.    Bilateral lens replacements. Orbits otherwise normal. The visualized  paranasal sinuses are free of significant disease. The visualized  portions of the mastoid and middle ear cavities appear clear. Small  foci of air in the region of the left cavernous sinus and sella  (presumably within intercavernous sinuses), probably due to recent  venipuncture. Similarly, small foci of air in the left infrazygomatic   space and left preauricular region are also likely due to  recent venipuncture. The bony calvarium and bones of the skull base  appear intact.       Impression    IMPRESSION:  Mildly motion-limited exam without definite findings of  acute intracranial abnormality.    BECK VAN MD   Echocardiogram Limited    Narrative    [id number]  [id number]  [id number]  [id number]^SHARLENE^LUCINA^HARVEY           [facility]  Echocardiography Laboratory  [address] [place] [address]        Name: JAIDA CLEMONS  MRN: [id number]  : 1945  Study Date: 2020 12:30 PM  Age: 74 yrs  Gender: Female  Patient Location: [facility]  Reason For Study: Afib  Ordering Physician: LUCINA SU  Referring Physician: [facility] [place]  Performed By: Autumn Awad RDCS     BSA: 1.7 m2  Height: 63 in  Weight: 154 lb  HR: 63  BP: 62/48 mmHg  _____________________________________________________________________________  __        Procedure  Limited Portable Echo Adult. (Emergent exam, abbreviated study  performed).  _____________________________________________________________________________  __        Interpretation Summary     Left ventricular systolic function is normal.  The visual ejection fraction is estimated at 60-65%.  There is moderate (2+) mitral regurgitation.  There is moderate (2+) tricuspid regurgitation.  Right ventricular systolic pressure is elevated, consistent with mild to  moderate pulmonary hypertension.  The study was technically limited.  _____________________________________________________________________________  __        Left Ventricle  Left ventricular systolic function is normal. The visual ejection fraction is  estimated at 60-65%. Regional wall motion abnormalities cannot be excluded due  to limited visualization.     Right Ventricle  The right ventricle is normal size. The right ventricular systolic function is  normal.     Mitral Valve  There is mild mitral annular calcification. The mitral valve leaflets are  mildly thickened. There is moderate (2+) mitral regurgitation.     Tricuspid Valve  There is moderate (2+) tricuspid regurgitation. The right ventricular systolic  pressure is approximated at 31.3 mmHg plus the right atrial pressure. IVC  diameter >2.1 cm collapsing <50% with sniff suggests a high RA pressure  estimated at 15 mmHg or greater. Right ventricular systolic pressure is  elevated, consistent with mild to moderate pulmonary hypertension.        Aortic Valve  The aortic valve is not well visualized. There is physiologic aortic  regurgitation.     Pericardium  There is no pericardial effusion.     Rhythm  Sinus rhythm was noted.  _____________________________________________________________________________  __           Doppler Measurements & Calculations  TR max donald: 279.9 cm/sec  TR max P.3 mmHg              _____________________________________________________________________________  __        Report approved by: Jak Ye 2020 01:35 PM       Cardiac Catheterization    Narrative    1. Normal coronary arteries  2. Moderately elevated left heart filling pressure (LVEDP 22 mmHg)   XR Chest Port 1 View   Result Value    Radiologist flags Malpositioned endotracheal tube. (AA)    Narrative    CHEST ONE VIEW PORTABLE   2/3/2020 3:00 PM     HISTORY:  Chest pain.    COMPARISON: None.      Impression    IMPRESSION: Right mainstem intubation with the tip of the endotracheal  tube approximately 1 cm beyond the marek. No pneumothorax or pleural  effusion. No airspace consolidation.    [Critical Result: Malpositioned endotracheal tube.]    Finding was identified on 2/3/2020 3:04 PM.     Lolis, the nurse taking care of the patient, was contacted by me on  2/3/2020 3:08 PM and verbalized understanding of the critical result.     ILYA COOK MD

## 2020-02-03 NOTE — ED NOTES
Bed: ED03  Expected date: 2/3/20  Expected time: 10:46 AM  Means of arrival: Ambulance  Comments:  [facility]

## 2020-02-03 NOTE — Clinical Note
Patient arrived to the cath lab with dopamine drip infusion at 10mcg/kg/min and propofol infusion at 20mcg/kg/min.  During cath propofol drip decreased to 15mcg/kg/min  Right radial arterial line placed in the ED

## 2020-02-03 NOTE — PROGRESS NOTES
[facility] ICU VENTILATOR RESPIRATORY NOTE    Date of Admission: 2/3/20  Date of Intubation (most recent): 2/3/20  Reason for Mechanical Ventilation: 1  Number of Days on Mechanical Ventilation: 1  Met Criteria for Pressure Support Trial: yes  Length of Pressure Support Trial: 5/5 for 45 min   Reason for Stopping Pressure Support Trial: pt agitated and needed more sedation  ETT appearance on chest x-ray: 1st CXR showed rt mainstem intubation. ETT pulled back 3 cm and repeat CXR done. ETT currently 22 @ lip.    Patient resting comfortably on the ventilator, settings:  Ventilation Mode: CMV/AC  (Continuous Mandatory Ventilation/ Assist Control)  FiO2 (%): 35 %  Rate Set (breaths/minute): 14 breaths/min  Tidal Volume Set (mL): 450 mL  PEEP (cm H2O): 5 cmH2O  Pressure Support (cm H2O): 5 cmH2O  Oxygen Concentration (%): 35 %  Resp: 10    Pt tolerated 45 minute PS trial, although she became agitated and was switched back to CMV.  Breath sounds are clear and diminished. Suctioning scant secretions from ETT. Will continue to monitor patient.

## 2020-02-03 NOTE — CONSULTS
Consult Date:  02/03/2020      REASON FOR CONSULTATION:  Possible ST elevation myocardial infarction.      HISTORY OF PRESENT ILLNESS:  The patient is a 74-year-old female with history of hypertension, hyperlipidemia and hypothyroidism who presented to the Emergency Department this morning with complaints of shortness of breath and chest pain.  Symptoms started approximately an hour prior to presentation to the ED while participating in water aerobics class.  She initially presented to a local clinic where an EKG demonstrated atrial fibrillation with rapid ventricular response.  The fibrillation was presumably new.  Her blood pressure at that time was stable.  When she came to our Emergency Department, she was noted to be hypotensive.  She subsequently deteriorated and apparently developed respiratory distress requiring intubation.  Repeat EKG initially showed her AFib with rapid ventricular response and ST-T changes.  Given the CT changes in the EKG and the patient's chest discomfort, the Cath Lab was activated for possible ST elevation myocardial  infarction.      PAST MEDICAL HISTORY:   1.  Hypertension.   2.  Hypothyroidism.   3.  Hyperlipidemia.      SOCIAL HISTORY:  Unable to obtain.      FAMILY HISTORY:  Reviewed and noncontributory to this admission.      HOME MEDICATIONS:   1.  Synthroid 88 mcg daily.   2.  Lisinopril 10 mg daily.   3.  Metoprolol 50 mg twice daily.   4.  Atorvastatin 40 mg daily.      REVIEW OF SYSTEMS:  Unable to obtain.      ALLERGIES:  NO KNOWN DRUG ALLERGIES.      IMPRESSION AND PLAN:   1.  Atrial fibrillation with rapid ventricular response.   2.  Hypertension, likely secondary to #1.     3.  History of hyperlipidemia, history of hypothyroidism.      The patient was brought to the cardiac catheterization lab emergently where coronary angiogram demonstrated normal coronary arteries.  Her hypertension presenting symptoms were most likely related to her atrial fibrillation with rapid  ventricular response.  A limited echo obtained in the ED showed preserved LV function and no wall motion abnormality.  She will need a dedicated echocardiogram once she is on the hospital floor.      We will transfer her to the ICU for further care.  I suspect she will be extubated later today.  If she has recurrent atrial fibrillation, she might benefit from antiarrhythmic drugs such as amiodarone.      Thirty minutes of critical care time was spent with this patient pre- and post-procedure.         AUTUMN CARMONA MD             D: 2020   T: 2020   MT: ROCKY      Name:     JAIDA CLEMONS   MRN:      [id number]        Account:       [id number]   :      1945           Consult Date:  2020      Document: [id number]

## 2020-02-03 NOTE — ED PROVIDER NOTES
"  History     Chief Complaint:  Chest Pain       The history is provided by the patient, the spouse and the EMS personnel.      Paty Grajeda is a 74 year old female who presents with her  for evaluation of left sided upper chest pain and shortness of breath starting one hour ago. The patient states she was at a water aerobics class when the pain began towards the end of the class and she felt she was unable to drive herself home and had to have her  pick her up. She states she has left sided posterior neck pain that radiates to her left ear, and notes some jaw pain as well. The patient has some nausea on exam as well as diaphoretic. She was seen at clinic today when and was sent here via EMS where she had a blood pressure of 110/90. While in the ED, the patient states that her pain worsened and that she is now experiencing a headache and \"pain all over.\" No nitroglycerin was given. The patient states she has been eating and drinking normally as of late, and has no abdominal pain, back pain or palpitations. She states that her current pain feels dissimilar to when she had her heart attack. The patient notes no history of atrial fibrillation, but notes that she did have a heart attack a few years ago. To note, the patient took her thyroid medication this morning.     Allergies:  No Known Drug Allergies    Medications:    Acyclovir  Atorvastatin  Levothyroxine  Lisinopril  Metoprolol  Omeprazole    Past Medical History:    Herpes   Thyroid disease     Past Surgical History:    ENT surgery  GYN surgery       Family History:    No past pertinent family history.    Social History:  The patient presents to the ED with her .  Smoking Status: Never Smoker  Drug Use: No  PCP: [facility], [place]     Review of Systems   Constitutional: Positive for diaphoresis. Negative for appetite change.   Respiratory: Positive for shortness of breath.    Cardiovascular: Positive for chest pain. Negative for " palpitations.   Gastrointestinal: Negative for abdominal pain.   Musculoskeletal: Positive for neck pain. Negative for back pain.        (+) Jaw pain   Neurological: Positive for headaches.   All other systems reviewed and are negative.      Physical Exam     Patient Vitals for the past 24 hrs:   BP Pulse Heart Rate Resp SpO2 Weight   02/03/20 1322 -- -- -- 16 -- --   02/03/20 1314 -- -- -- 16 -- --   02/03/20 1302 -- -- -- 16 -- --   02/03/20 1235 -- -- 86 25 97 % --   02/03/20 1230 -- -- 52 16 98 % --   02/03/20 1225 -- -- -- -- 100 % --   02/03/20 1220 -- -- -- -- 90 % --   02/03/20 1215 (!) 85/70 -- 112 23 (!) 73 % --   02/03/20 1210 99/86 98 99 8 (!) 78 % --   02/03/20 1205 (!) 80/67 100 115 16 100 % --   02/03/20 1202 -- -- -- -- -- 70 kg (154 lb 5.2 oz)   02/03/20 1201 (!) 62/48 -- -- -- -- --   02/03/20 1200 (!) 66/53 114 117 22 (!) 89 % --   02/03/20 1115 (!) 87/58 114 125 19 100 % --   02/03/20 1110 (!) 71/49 130 144 -- 99 % --   02/03/20 1105 (!) 53/23 154 149 18 97 % --   02/03/20 1100 (!) 79/60 156 152 12 98 % --   02/03/20 1059 (!) 79/60 156 -- 18 99 % --       Physical Exam  Constitutional: uncomfortable, ill appearing  HENT:    Nose: Nose normal.    Mouth/Throat: Oropharynx is clear, mucous membranes are moist   Eyes: EOM are normal.   CV: irregularly irregular, tachycardic   Chest: Effort normal and breath sounds normal.   GI:  There is no tenderness. No distension. Normal bowel sounds  MSK: Normal range of motion.   Neurological: Alert, attentive  Skin: Skin is warm and dry.      Emergency Department Course     ECG:  Indication: Chest Pain  Time: 1025  Vent. Rate 123 bpm. ID interval 172. QRS duration 84. QT/QTc 302/432. P-R-T axis 26 -22 16. Sinus tachycardia. Inferior infarct, age undetermined. Anterior infarct, age undetermined. Abnormal ECG.  No significant change compared to EKG dated 7/17/15. Read time: 1027      Imaging:  Radiology findings were communicated with the family and Admitting MD  who voiced understanding of the findings.    XR Chest Port 1 View:  No airspace consolidation, pneumothorax, or pleural effusion.  As per radiology.     CT Head without contrast:   Mildly motion-limited exam without definite findings of acute intracranial abnormality.  As per radiology.    CT Chest/Abdomen/Pelvis w/ IV contrast:   1. Pulmonary interstitial thickening along with vascular congestion suggestive of CHF.  2. Stable hemangioma in the RIGHT lobe of the liver.  3. Stable LEFT adrenal adenoma.  As per radiology.     CT Head w/o Contrast   Preliminary Result   IMPRESSION:  Mildly motion-limited exam without definite findings of   acute intracranial abnormality.      CT Chest/Abdomen/Pelvis w Contrast   Final Result   IMPRESSION:   1. Pulmonary interstitial thickening along with vascular congestion   suggestive of CHF.   2. Stable hemangioma in the RIGHT lobe of the liver.   3. Stable LEFT adrenal adenoma.      ILYA COOK MD      XR Chest Port 1 View   Final Result   IMPRESSION: No airspace consolidation, pneumothorax, or pleural   effusion.      ILYA COOK MD      Cardiac Catheterization    (Results Pending)   Echocardiogram Limited    (Results Pending)   XR Chest Port 1 View    (Results Pending)       Laboratory:  Laboratory findings were communicated with the family and Admitting MD who voiced understanding of the findings.    CBC: WBC: 6.8, HGB: 14.4, PLT: 139 (low)    BMP: Glucose 333 (high), Chloride 111 (high), CO2 6 (low), Creatinine 1.13 (high), GFR 48 (low),  o/w WNL     1101 Troponin: <0.015     1204 Troponin POCT: 0.01     BNP: 872    [facility]    -Intubation  Date/Time: 2/3/2020 12:53 PM  Performed by: You Montiel MD  Authorized by: You Montiel MD     UNIVERSAL PROTOCOL   Site Marked: NA  Prior Images Obtained and Reviewed:  NA  Required items: Required blood products, implants, devices and special equipment available    Patient identity confirmed:   Anonymous protocol, patient vented/unresponsive  NA - No sedation, light sedation, or local anesthesia  Confirmation Checklist:  Patient's identity using two indicators  Time out: Immediately prior to the procedure a time out was called    Universal Protocol: the Joint Commission Universal Protocol was followed    Preparation: Patient was prepped and draped in usual sterile fashion          PRE-PROCEDURE DETAILS     Patient status:  Unresponsive    Mallampati score:  I    Pretreatment medications:  None    Paralytics:  Succinylcholine      PROCEDURE DETAILS     Preoxygenation:  Bag valve mask    CPR in progress: no      Intubation method:  Oral    Oral intubation technique:  Video-assisted    Laryngoscope blade:  Mac 3    Tube size (mm):  7.5    Tube type:  Cuffed    Number of attempts:  1    Ventilation between attempts: no      Cricoid pressure: no      Tube visualized through cords: yes      PLACEMENT ASSESSMENT     ETT to lip:  25    Tube secured with:  ETT watt    Breath sounds:  Equal and absent over the epigastrium    Placement verification: chest rise, condensation, direct visualization, equal breath sounds and ETCO2 detector      CXR findings:  ETT in proper place  PROCEDURE   Patient Tolerance:  Patient tolerated the procedure well with no immediate complications      PROCEDURE NOTE: ED BEDSIDE LIMITED ULTRASOUND  Name of the study: E-FAST Exam  Performed by: You Montiel MD  Indications: hypotension  Body Location / Organs Imaged: Four quadrants (perihepatic, perisplenic, pelvic, pericardial) of the abdomen were scanned; the exam was continued to the chest using the linear probe, where the lungs were evaluated.  Findings: Four quadrants (perihepatic, perisplenic, pelvic, pericardial) were negative for free fluid. Positive lung sliding sign and comet tail sign noted to the bilateral anterior chest. Poor cardiac squeeze globally.   Interpretation: No evidence of acute hemoperitoneum, pericardial  effusion or pneumothorax.  Archiving of images: Images were not saved digitally due to emergent condition.    Interventions:  1114 Zofran 8mg IV  1120 Fentanyl 25 mcg IV  1207 Dopamine 13.1mL/hr IV  1211 Heparin 5,000 units IV  1214 Dopamine increased to 26.3mL/hr IV  1222 Epinephrine 0.1 mg IV  1224 Succinylcholine 120 mcg IV  1224 Ketamine 200 mcg IV  1235 Versed 5 mg, Fentanyl 100 mcg;   Propofol gtt    Emergency Department Course:  Past medical records, nursing notes, and vitals reviewed.    1105 I performed an exam of the patient as documented above.     EKG obtained in the ED, see results above.   IV was inserted and blood was drawn for laboratory testing, results above.  The patient was sent for a CT while in the emergency department, results above.     1106 Troponin ordered.    1108 Oral and temporal temperature unable to be taken.    1109 Heart rate 140.    1115 Portable ultrasound used, chest visualized.     1116 Blood pressure 87/58.    1117 25mcg fentanyl ordered.    1120 Heart rate 121. 25mcg fentanyl given.    1121  Portable x-ray arrived. Patient now describing headache, back pain, and chest pain    1122  CT ordered.    1124  X-ray obtained.    1125 Patient feeling somewhat improved.    1126 Patient left for CT.    1139 I checked in with the patient at CT.    1149 Patient returned from CT.    1151 I rechecked the patient.    Repeat EKG shows posterior STEMI, cath lab activated, discussed patient with Dr. Villela.    1207 Dopamine drip started.    1211 Heparin 5,000 units given.    1214 Dopamine increased.    1222 Epinephrine 1mg given.     1224 Succinylcholine 120 mcg given.    1224 Ketamine 200 mcg given.       Limited echo performed, patient now in sinus bradycardia. Patient taken to cath lab. Cardiology at bedside.     Impression & Plan       CMS Diagnoses: The patient has a STEMI     The patient was evaluated at 1175   Patient was transferred  to the cath lab which was activated due to ECG  changes.   ASA given by medics or taken today    Medical Decision Making:  This is a critically ill 74-year-old female who presents for evaluation of chest pain after exercising today and was found to have significant abnormal EKG findings including atrial fibrillation with rapid ventricular response as well as evolving back pain, abdominal pain, headache, concerns for also neurologic or dissection related pain.  Patient was aggressively resuscitated with fluid with later administration of dopamine.  With improved heart rate, posterior STEMI was suspected and Cath Lab activated.  Patient then began to become less responsive despite improved hemodynamic parameters and was intubated.  Shortly after intubation, likely related to vagal stimulus, the patient converted to sinus bradycardia with continued ST depression along the precordium.  Heparin was administered and aspirin given prior to arrival.  Patient is sufficiently stabilized for Cath Lab although remains in guarded condition.    Critical Care Time: was 75 minutes for this patient excluding procedures    Diagnosis:    ICD-10-CM   1. Chest pain R07.9   2. ST elevation myocardial infarction (STEMI), unspecified artery (H) I21.3   3. Persistent atrial fibrillation with RVR I48.19       Disposition:  Admitted to Cath Lab.    Scribe Disclosure:  Wilder BRAGG, am serving as a scribe at 11:05 AM on 2/3/2020 to document services personally performed by You Montiel MD based on my observations and the provider's statements to me.           You Montiel MD  02/03/20 9149

## 2020-02-04 ENCOUNTER — APPOINTMENT (OUTPATIENT)
Dept: CARDIOLOGY | Facility: CLINIC | Age: 75
DRG: 287 | End: 2020-02-04
Attending: INTERNAL MEDICINE
Payer: COMMERCIAL

## 2020-02-04 VITALS
DIASTOLIC BLOOD PRESSURE: 82 MMHG | WEIGHT: 150.57 LBS | TEMPERATURE: 98.8 F | HEIGHT: 62 IN | OXYGEN SATURATION: 94 % | RESPIRATION RATE: 20 BRPM | SYSTOLIC BLOOD PRESSURE: 151 MMHG | HEART RATE: 57 BPM | BODY MASS INDEX: 27.71 KG/M2

## 2020-02-04 LAB
ALBUMIN SERPL-MCNC: 3 G/DL (ref 3.4–5)
ALP SERPL-CCNC: 56 U/L (ref 40–150)
ALT SERPL W P-5'-P-CCNC: 84 U/L (ref 0–50)
ANION GAP SERPL CALCULATED.3IONS-SCNC: 8 MMOL/L (ref 3–14)
AST SERPL W P-5'-P-CCNC: 72 U/L (ref 0–45)
BASOPHILS # BLD AUTO: 0 10E9/L (ref 0–0.2)
BASOPHILS NFR BLD AUTO: 0.2 %
BILIRUB SERPL-MCNC: 0.5 MG/DL (ref 0.2–1.3)
BUN SERPL-MCNC: 16 MG/DL (ref 7–30)
CALCIUM SERPL-MCNC: 7.6 MG/DL (ref 8.5–10.1)
CHLORIDE SERPL-SCNC: 118 MMOL/L (ref 94–109)
CO2 SERPL-SCNC: 21 MMOL/L (ref 20–32)
CREAT SERPL-MCNC: 0.84 MG/DL (ref 0.52–1.04)
DIFFERENTIAL METHOD BLD: ABNORMAL
EOSINOPHIL # BLD AUTO: 0 10E9/L (ref 0–0.7)
EOSINOPHIL NFR BLD AUTO: 0.2 %
ERYTHROCYTE [DISTWIDTH] IN BLOOD BY AUTOMATED COUNT: 15.2 % (ref 10–15)
GFR SERPL CREATININE-BSD FRML MDRD: 68 ML/MIN/{1.73_M2}
GLUCOSE BLDC GLUCOMTR-MCNC: 72 MG/DL (ref 70–99)
GLUCOSE BLDC GLUCOMTR-MCNC: 77 MG/DL (ref 70–99)
GLUCOSE BLDC GLUCOMTR-MCNC: 79 MG/DL (ref 70–99)
GLUCOSE BLDC GLUCOMTR-MCNC: 80 MG/DL (ref 70–99)
GLUCOSE BLDC GLUCOMTR-MCNC: 95 MG/DL (ref 70–99)
GLUCOSE SERPL-MCNC: 91 MG/DL (ref 70–99)
HCT VFR BLD AUTO: 38.2 % (ref 35–47)
HGB BLD-MCNC: 12.3 G/DL (ref 11.7–15.7)
IMM GRANULOCYTES # BLD: 0 10E9/L (ref 0–0.4)
IMM GRANULOCYTES NFR BLD: 0.2 %
INTERPRETATION ECG - MUSE: NORMAL
LMWH PPP CHRO-ACNC: 0.74 IU/ML
LYMPHOCYTES # BLD AUTO: 0.9 10E9/L (ref 0.8–5.3)
LYMPHOCYTES NFR BLD AUTO: 9.6 %
MCH RBC QN AUTO: 30.8 PG (ref 26.5–33)
MCHC RBC AUTO-ENTMCNC: 32.2 G/DL (ref 31.5–36.5)
MCV RBC AUTO: 96 FL (ref 78–100)
MONOCYTES # BLD AUTO: 1 10E9/L (ref 0–1.3)
MONOCYTES NFR BLD AUTO: 10.7 %
NEUTROPHILS # BLD AUTO: 7.1 10E9/L (ref 1.6–8.3)
NEUTROPHILS NFR BLD AUTO: 79.1 %
NRBC # BLD AUTO: 0 10*3/UL
NRBC BLD AUTO-RTO: 0 /100
PLATELET # BLD AUTO: 88 10E9/L (ref 150–450)
POTASSIUM SERPL-SCNC: 3.5 MMOL/L (ref 3.4–5.3)
PROT SERPL-MCNC: 5.2 G/DL (ref 6.8–8.8)
RBC # BLD AUTO: 3.99 10E12/L (ref 3.8–5.2)
SODIUM SERPL-SCNC: 147 MMOL/L (ref 133–144)
WBC # BLD AUTO: 9 10E9/L (ref 4–11)

## 2020-02-04 PROCEDURE — 99232 SBSQ HOSP IP/OBS MODERATE 35: CPT | Mod: 25 | Performed by: INTERNAL MEDICINE

## 2020-02-04 PROCEDURE — 25800030 ZZH RX IP 258 OP 636: Performed by: INTERNAL MEDICINE

## 2020-02-04 PROCEDURE — 0298T ZIO PATCH HOLTER ADULT PEDIATRIC GREATER THAN 48 HRS: CPT | Performed by: INTERNAL MEDICINE

## 2020-02-04 PROCEDURE — 00000146 ZZHCL STATISTIC GLUCOSE BY METER IP

## 2020-02-04 PROCEDURE — 94003 VENT MGMT INPAT SUBQ DAY: CPT

## 2020-02-04 PROCEDURE — 85025 COMPLETE CBC W/AUTO DIFF WBC: CPT | Performed by: INTERNAL MEDICINE

## 2020-02-04 PROCEDURE — 99239 HOSP IP/OBS DSCHRG MGMT >30: CPT | Performed by: INTERNAL MEDICINE

## 2020-02-04 PROCEDURE — 85520 HEPARIN ASSAY: CPT | Performed by: INTERNAL MEDICINE

## 2020-02-04 PROCEDURE — 80053 COMPREHEN METABOLIC PANEL: CPT | Performed by: INTERNAL MEDICINE

## 2020-02-04 PROCEDURE — 93306 TTE W/DOPPLER COMPLETE: CPT | Mod: 26 | Performed by: INTERNAL MEDICINE

## 2020-02-04 PROCEDURE — 25000128 H RX IP 250 OP 636: Performed by: INTERNAL MEDICINE

## 2020-02-04 PROCEDURE — 0296T ZIO PATCH HOLTER ADULT PEDIATRIC GREATER THAN 48 HRS: CPT

## 2020-02-04 PROCEDURE — 25000132 ZZH RX MED GY IP 250 OP 250 PS 637: Performed by: INTERNAL MEDICINE

## 2020-02-04 PROCEDURE — 40000275 ZZH STATISTIC RCP TIME EA 10 MIN

## 2020-02-04 PROCEDURE — 25500064 ZZH RX 255 OP 636: Performed by: INTERNAL MEDICINE

## 2020-02-04 PROCEDURE — 93306 TTE W/DOPPLER COMPLETE: CPT

## 2020-02-04 RX ORDER — AMLODIPINE BESYLATE 10 MG/1
10 TABLET ORAL ONCE
Status: COMPLETED | OUTPATIENT
Start: 2020-02-04 | End: 2020-02-04

## 2020-02-04 RX ORDER — LEVOTHYROXINE SODIUM 75 UG/1
75 TABLET ORAL DAILY
COMMUNITY

## 2020-02-04 RX ORDER — HEPARIN SODIUM 10000 [USP'U]/100ML
1000 INJECTION, SOLUTION INTRAVENOUS CONTINUOUS
Status: DISCONTINUED | OUTPATIENT
Start: 2020-02-04 | End: 2020-02-04

## 2020-02-04 RX ORDER — WARFARIN SODIUM 5 MG/1
5 TABLET ORAL DAILY
Qty: 30 TABLET | Refills: 0 | Status: ON HOLD | OUTPATIENT
Start: 2020-02-04 | End: 2022-08-20

## 2020-02-04 RX ORDER — PREDNISOLONE ACETATE 10 MG/ML
1 SUSPENSION/ DROPS OPHTHALMIC EVERY EVENING
COMMUNITY

## 2020-02-04 RX ORDER — FLUTICASONE PROPIONATE 50 MCG
1 SPRAY, SUSPENSION (ML) NASAL
COMMUNITY

## 2020-02-04 RX ORDER — METOPROLOL SUCCINATE 25 MG/1
25 TABLET, EXTENDED RELEASE ORAL 2 TIMES DAILY
COMMUNITY

## 2020-02-04 RX ORDER — ACETAMINOPHEN 500 MG
1000 TABLET ORAL
COMMUNITY
End: 2024-05-06

## 2020-02-04 RX ADMIN — HUMAN ALBUMIN MICROSPHERES AND PERFLUTREN 3 ML: 10; .22 INJECTION, SOLUTION INTRAVENOUS at 09:15

## 2020-02-04 RX ADMIN — SODIUM CHLORIDE: 9 INJECTION, SOLUTION INTRAVENOUS at 10:02

## 2020-02-04 RX ADMIN — AMLODIPINE BESYLATE 10 MG: 10 TABLET ORAL at 09:44

## 2020-02-04 RX ADMIN — PROPOFOL 20 MCG/KG/MIN: 10 INJECTION, EMULSION INTRAVENOUS at 05:40

## 2020-02-04 ASSESSMENT — ACTIVITIES OF DAILY LIVING (ADL)
FALL_HISTORY_WITHIN_LAST_SIX_MONTHS: NO
AMBULATION: 0-->INDEPENDENT
RETIRED_COMMUNICATION: 0-->UNDERSTANDS/COMMUNICATES WITHOUT DIFFICULTY
RETIRED_EATING: 0-->INDEPENDENT
BATHING: 0-->INDEPENDENT
ADLS_ACUITY_SCORE: 16
COGNITION: 0 - NO COGNITION ISSUES REPORTED
TOILETING: 0-->INDEPENDENT
ADLS_ACUITY_SCORE: 16
ADLS_ACUITY_SCORE: 16
SWALLOWING: 0-->SWALLOWS FOODS/LIQUIDS WITHOUT DIFFICULTY
TRANSFERRING: 0-->INDEPENDENT
DRESS: 0-->INDEPENDENT

## 2020-02-04 ASSESSMENT — MIFFLIN-ST. JEOR: SCORE: 1136.25

## 2020-02-04 NOTE — PROGRESS NOTES
RT- Patient was on weaning trial from 0530-0950. MD gave verbal okay to extubate at that time. Patient suctioned before both orally and through ETT. Tolerated well. Remains on room air.

## 2020-02-04 NOTE — PLAN OF CARE
ICU End of Shift Summary.  For vital signs and complete assessments, please see documentation flowsheets.     Pertinent assessments: Intubated, following commands. RASS goal at 0 to -1. Tele SR, rate in 60s-70s. LS clear/dim. BP stable- dopamine gtt off. Right art line. Right femoral sheath- to be removed when PTT <50, trending down. 2+ pulses, +CMS. , Abisai, at bedside.  Major Shift Events: Dopamine gtt stopped. Lower extremity ultrasound negative for DVT.  Plan to remain intubated overnight per MD's.    Plan (Upcoming Events): Wean & possible extubate saima AM. PTT trending down- remove right femoral sheath when PTT<50. Start empiric heparin gtt when sheath removed  Discharge/Transfer Needs: TBD    Bedside Shift Report Completed :   Bedside Safety Check Completed:

## 2020-02-04 NOTE — PROGRESS NOTES
Tele ICU:  Called regarding hypotension post sheath removal. Patient also received Fentanyl and versed in addition to propofol for stent removal. Unclear if this is sedation mediated, or other etiology.     Given sheath recently in place and risk for retroperitoneal bleeding, will get stat CBC, BMP, and titrate other sedation (propofol) off. Okay to hold on heparin for anticoagulation given patient is now in NS.     If no improvement, low threshold for CT scan for evaluation of retroperitoneum. Okay to use dopamine in meantime for BP management    Usha Hernandez MD 2/3/2020 8:55 PM     Hemoglobin returned at 13.0 from 13.6, but with decrease in WBC and platelets, unlikely to be a sign of ongoing bleeding. Patient also off pressors and maintaining more than adequate BPs.   Hypotensive episode likely related to sedation.    Will continue to follow. Recommended titration of propofol to RASS of 0 to -1.    Usha Hernandez MD 2/3/2020 9:33 PM     Noted Platelet drop to 88, 4T score of 2 consistent with low risk of HIT. Patient with history of thrombocytopenia and possible consumption from recent procedure. Continue to trend.     Usha Hernandez MD 2/4/2020 5:29 AM

## 2020-02-04 NOTE — PROGRESS NOTES
[facility]    Cardiology Progress Note    ASSESSMENT:  74-year-old female seen for new A. fib.  She presented with hypotension and respiratory failure requiring intubation.  Angiogram showed only minimal coronary disease.  Echo shows preserved ejection fraction.  There is no obvious precipitant for the A. fib.  She spontaneously converted back to sinus in the emergency room yesterday.    She has remained in sinus rhythm.  She had a few short runs of SVT, but nothing sustained.  She was successfully extubated this morning and feels quite good.    She should be on anticoagulation at least 3 to 6 months.  She will need close outpatient monitoring of her rhythm.  If she has recurrent A. fib, she should be on anticoagulation longer if not indefinitely.    RECOMMENDATIONS:  1.  Newly diagnosed atrial fibrillation  - Recommend anticoagulation  -Discharge with metoprolol in case she goes back into A. Fib  - Discharged with a 14-day ZIO monitor    Patient would prefer to follow-up with [facility].  She will see cardiology there.    Mehdi Olivera MD  Cardiology - [facility]  Pager:  [phone]  Text Page    SUBJECTIVE: Extubated this morning.  Awake and talkative, drowsy, but no complaints.    MEDICATIONS:  Current Facility-Administered Medications   Medication     acetaminophen (TYLENOL) tablet 650 mg     bisacodyl (DULCOLAX) Suppository 10 mg     glucose gel 15-30 g    Or     dextrose 50 % injection 25-50 mL    Or     glucagon injection 1 mg     DOPamine 400 mg in dextrose 5% 250 mL (adult std) - premix     heparin 25,000 units in 0.45% NaCl 250 mL ANTICOAGULANT  infusion     HOLD:  Metformin and metformin containing medications if patient received IV contrast with acute kidney injury or severe chronic kidney disease (stage IV or stage V; i.e., eGFR less than 30)     insulin aspart (NovoLOG) inj (RAPID ACTING)     magnesium hydroxide (MILK OF MAGNESIA) suspension 30 mL     metoprolol (LOPRESSOR)  injection 2.5 mg     naloxone (NARCAN) injection 0.1-0.4 mg     naloxone (NARCAN) injection 0.1-0.4 mg     naloxone (NARCAN) injection 0.1-0.4 mg     ondansetron (ZOFRAN-ODT) ODT tab 4 mg    Or     ondansetron (ZOFRAN) injection 4 mg     prochlorperazine (COMPAZINE) injection 5 mg    Or     prochlorperazine (COMPAZINE) tablet 5 mg    Or     prochlorperazine (COMPAZINE) Suppository 12.5 mg     propofol (DIPRIVAN) infusion    And     propofol (DIPRIVAN) infusion 10-20 mg     senna-docusate (SENOKOT-S/PERICOLACE) 8.6-50 MG per tablet 1 tablet    Or     senna-docusate (SENOKOT-S/PERICOLACE) 8.6-50 MG per tablet 2 tablet     sodium chloride 0.9% infusion       ALLERGIES:  No Known Allergies    REVIEW OF SYSTEMS:  General: no fatigue, weight loss  Cardiac: per HPI  Respiratory: per HPI  GI: no nausea, emesis, melena  Musculoskeletal: no weakness  Neurologic: no aphasia, paraesthesias  Neuropsychiatric: no depression    PHYSICAL EXAM:  Temp: 96.1  F (35.6  C) Temp src: Bladder BP: 131/65 Pulse: 59 Heart Rate: 62 Resp: 13 SpO2: 99 % O2 Device: Mechanical Ventilator Oxygen Delivery: 3 LPM  Vital Signs with Ranges  Temp:  [93  F (33.9  C)-100.4  F (38  C)] 96.1  F (35.6  C)  Pulse:  [] 59  Heart Rate:  [] 62  Resp:  [8-28] 13  BP: ()/(23-99) 131/65  MAP:  [54 mmHg-119 mmHg] 101 mmHg  Arterial Line BP: ()/(32-86) 157/68  FiO2 (%):  [30 %-100 %] 30 %  SpO2:  [73 %-100 %] 99 %  150 lbs 9.19 oz    Constitutional: awake, alert, Ox3  Eyes: PERRL, sclera nonicteric  ENT: trachea midline  Respiratory: Lungs clear  Cardiovascular: Regular rate and rhythm, no murmurs  GI: nondistended, nontender, bowel sounds present  Lymph/Hematologic: no lymphadenopathy  Skin: dry, no rash  Musculoskeletal: good muscle tone, strength 5/5 in upper and lower extremities  Neurologic: no focal deficits  Neuropsychiatric: appropriate affact    Intake/Output Summary (Last 24 hours) at 2/4/2020 1004  Last data filed at 2/4/2020  1000  Gross per 24 hour   Intake 1170.91 ml   Output 2485 ml   Net -1314.09 ml       DATA:  Labs: Sodium 147, potassium 3.5, creatinine 0.8, troponin negative, NT proBNP 870, WBC 9, hemoglobin 12, platelets 88    Tele: Sinus rhythm, no recurrent A. fib, few short runs of SVT, few runs 4-5 beats of a borderline wide QRS complex    Echo: February 3, 2020: EF 60%, normal RV, 2+ MR, 2+ TR    Cath: Mild coronary disease, no obstructive lesions, LVEDP 22

## 2020-02-04 NOTE — PLAN OF CARE
"Rheumatology Clinic Visit      Jorge Abarca MRN# [id number]   YOB: 1958 Age: 59 year old      Date of visit: 1/03/18   PCP: Dr. Coy Varela  Ophthalmology: [facility] [place]     Chief Complaint   Patient presents with:  RECHECK: 6 month follow up; index finger on the left hand has been \"popping\" and inability to bend.      Assessment and Plan   1. Seronegative nonerosive rheumatoid arthritis (RF negative, CCP negative): Symptoms began May 2015 and progressively worsened; initially with symmetric synovitis of the PIPs and MCPs and morning stiffness > 1 hour; steroid responsive. Currently on MTX 20mg PO weekly, folic acid 1mg daily, and HCQ 400mg daily.  Doing well today.  - Continue methotrexate 20mg once weekly  - Continue hydroxychloroquine 400mg daily (ophthalmology exam last on 12/5/2016; I encouraged her to update her eye exam)  - Continue folic acid 1mg daily  - Labs 2-3 days prior to the next rheumatology clinic visit: CBC, Creatinine, Hepatic Panel              Rapid 3, cumulative scores                      01/03/2018: 1    (MTX 20mg wkly and HCQ 400mg daily)                      06/28/2017: 1    (MTX 20mg wkly and HCQ 400mg daily)                      12/21/2016: 1    (MTX 20mg wkly and HCQ 400mg daily)                      09/07/2016: 1.5 (MTX 20mg wkly and HCQ 400mg daily)    2. Left 3rd digit trigger finger: Steroid injection on 11/17/2015 with resolution for several months, then again in September 2016 with resolution of it again.  Doing well today.    3. Left second digit trigger finger: This is new.  Steroid injection as documented in the procedure section.  Given recurrence of trigger fingers but no synovitis on exam to suggest increased rheumatoid arthritis disease activity, I suspect that the trigger fingers are secondary to other activities.  Recommended that in addition to the steroid injection today, that she go to hand therapy to learn exercises.   - Hand therapy " ICU End of Shift Summary.  For vital signs and complete assessments, please see documentation flowsheets.     Pertinent assessments: Pt alert, oriented, sedated lightly with propofol.  Tele SB, occ PACs, in the 50's.  BP was an issue at times but treated with IVF, titration down of propofol and dopamine drip intermittently.  Intubated, LS coarse, thick secretions. BS present.  Schumacher with adequate urine output.  R groin site CDI, PP present. Heparin infusing  Major Shift Events: bath completed, BP issues  Plan (Upcoming Events): ?wean and extubate this morning  Discharge/Transfer Needs: TBD    Bedside Shift Report Completed : y  Bedside Safety Check Completed: y       referral    4. History of DVT: reportedly associated with birth control; no recurrence    5. Bone Health: 11/2015 Vitamin D level normal.  Normal DEXA in 2011.     Ms. Abarac verbalized agreement with and understanding of the rational for the diagnosis and treatment plan.  All questions were answered to best of my ability and the patient's satisfaction. Ms. Abarca was advised to contact the clinic with any questions that may arise after the clinic visit.      Thank you for involving me in the care of the patient    Return to clinic: 3 months    HPI   Jorge Abarca is a 59 year old female with a medical history significant for GERD, diabetes, hypertension, hyperlipidemia, lumbar degenerative disc disease s/p L4-L5 microdiskectomy in 2013, left first toe fracture s/p nonsurgical treatment, allergic rhinitis (receiving allergy shots), asthma, and history of DVT who presents for f/u of seronegative rheumatoid arthritis.     Today, Ms. Abarca reports that she is doing well except for the inability to completely flex the left second finger because of trigger finger.  Previously, the left third digit was having a trigger finger but this resolved with a steroid injection that was previously given.  The current left second digit trigger finger has been present for approximately 2 months.  Other than the trigger finger, she reports that she is doing well with morning stiffness for no more than 30 minutes.  The rest of the joints are doing well.      Denies fevers, chills, nausea, vomiting, diarrhea. Chronic on and off constipation. No abdominal pain. No chest pain/pressure, palpitations, or shortness of breath. No oral or nasal sores. No neck pain. No LE swelling. No dry mouth. Dry eyes doing well with infrequent use of artificial tears.  No eye pain.    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   SKIN: See history of present illness  HEENT:  No epistaxis. No oral or nasal ulcers.  CV: No chest pain, pressure, palpitations, or dyspnea on exertion.  PULM: No SOB, wheeze, cough.  GI:  No nausea, vomiting, diarrhea. See history of present illness. No blood in stool. No abdominal pain.    : No blood in urine.  MSK: See HPI.  NEURO: No numbness, tingling, or weakness.  EXT: No LE swelling    Active Problem List     Patient Active Problem List   Diagnosis     Hypertension goal BP (blood pressure) < 140/90     Allergy to mold spores     House dust mite allergy     Allergic rhinitis due to animal dander     Need for desensitization to allergens     Diagnostic skin and sensitization tests     Hyperlipidemia LDL goal <100     Degenerative disc disease, lumbar     Lumbar disc herniation     History of DVT (deep vein thrombosis)     Mild persistent asthma without complication     Seronegative rheumatoid arthritis (H)     High risk medications (not anticoagulants) long-term use     Trigger finger, left middle finger     Allergic rhinitis due to pollen     Type 2 diabetes mellitus without complication, without long-term current use of insulin (H)     Seasonal allergic rhinitis, unspecified allergic rhinitis trigger     Morbid obesity due to excess calories (H)     Grief     Advanced directives, counseling/discussion     Past Medical History     Past Medical History:   Diagnosis Date     Allergic rhinitis due to animal dander      Allergy to mold spores     7/03 skin tests per [facility]: pos. for cat/dog/CR/DM/M/T/G/W     Diabetes (H)      Diagnostic skin and sensitization tests 7/03 skin tests per [facility]: pos. for cat/dog/CR/DM/M/T/G/W     DVT (deep venous thrombosis) (H)      Eczema     sees [facility]     GERD (gastroesophageal reflux disease)      High cholesterol     occ.     House dust mite allergy      HTN (hypertension)      Indigestion     uses OTC Zantac prn     Mild  persistent asthma      Morbid obesity due to excess calories (H)      Need for desensitization to allergens 9/03 started at [facility]. for: cat/dog/DM/M/T/G/W    start IT at [facility]: about 1/12      PONV (postoperative nausea and vomiting)      RA (rheumatoid arthritis) (H)      Seasonal allergic rhinitis      Past Surgical History     Past Surgical History:   Procedure Laterality Date     BACK SURGERY  2009     [facility] SURGICAL PATHOLOGY  1988    left foot     DISCECTOMY LUMBAR POSTERIOR MICROSCOPIC ONE LEVEL  3/18/2013    Procedure: DISCECTOMY LUMBAR POSTERIOR MICROSCOPIC ONE LEVEL;  Left Lumbar 4-5 Micro Discectomy;  Surgeon: Dallas Jensen MD;  Location: [facility]     TONSILLECTOMY & ADENOIDECTOMY  age 5     Allergy     Allergies   Allergen Reactions     Aleve [Naproxen Sodium] Hives     Hydralazine      Nexium [Esomeprazole Magnesium Trihydrate] Hives     HIVES     Shellfish Allergy Nausea     Sulfa Drugs      Stomach upset     Amlodipine Besylate Rash     Hctz Rash     Lisinopril Rash     Current Medication List     Current Outpatient Prescriptions   Medication Sig     methotrexate sodium 2.5 MG TABS Take 20 mg by mouth once a week . Take all 8 tablets on the same day of each week.     metoprolol (TOPROL-XL) 25 MG 24 hr tablet TAKE 1 TABLET(25 MG) BY MOUTH DAILY     losartan (COZAAR) 100 MG tablet TAKE 1 TABLET (100 MG) BY MOUTH DAILY     metFORMIN (GLUCOPHAGE) 1000 MG tablet Take 1 tablet (1,000 mg) by mouth 2 times daily (with meals)     order for DME Class I [employer] compression stockings     fluticasone (FLOVENT HFA) 110 MCG/ACT Inhaler Inhale 1 puff into the lungs daily     hydrOXYzine (ATARAX) 25 MG tablet TAKE 1 TO 2 TABLETS BY MOUTH EVERY NIGHT AT BEDTIME AS NEEDED FOR ITCH     cetirizine (ZYRTEC ALLERGY) 10 MG tablet Take 1-2 tablets (10-20 mg) by mouth daily as needed for allergies     simvastatin (ZOCOR) 10 MG tablet Take 1 tablet (10 mg) by mouth At Bedtime     hydroxychloroquine (PLAQUENIL) 200 MG  tablet Take 2 tablets (400 mg) by mouth daily     folic acid (FOLVITE) 1 MG tablet Take 1 tablet (1 mg) by mouth daily     aspirin 81 MG tablet Take 1 tablet (81 mg) by mouth daily     blood glucose monitoring (NO BRAND SPECIFIED) test strip Use to test blood sugar 1 times daily or as directed.  One touch ultra test strips     tacrolimus (PROTOPIC) 0.1 % ointment Apply to the face daily, already failed desonide     triamcinolone (NASACORT) 55 MCG/ACT nasal inhaler Spray 2 sprays into both nostrils daily     levalbuterol (XOPENEX HFA) 45 MCG/ACT inhaler Inhale 2 puffs into the lungs every 4 hours as needed     CRANBERRY      FISH OIL      Cholecalciferol (VITAMIN D PO) Take  by mouth.     Ascorbic Acid (VITAMIN C PO) Take  by mouth.     Multiple Vitamin (DAILY MULTIVITAMIN PO) Take  by mouth.     Calcium Carbonate-Vit D-Min (CALCIUM 1200 PO) Take  by mouth.     ibuprofen 200 MG capsule Take 200 mg by mouth every 4 hours as needed.     [DISCONTINUED] amLODIPine (NORVASC) 5 MG tablet Take 1 tablet (5 mg) by mouth daily     No current facility-administered medications for this visit.      Social History   See HPI    Family History     Family History   Problem Relation Age of Onset     Cardiovascular Sister      atrial fibrillation     CEREBROVASCULAR DISEASE Brother      carotid stenosis     CANCER Father      lung     DIABETES Mother      Breast Cancer Maternal Grandmother      also cousin on this side     Breast Cancer Paternal Aunt      C.A.D. No family hx of      Autoimmune Disease No family hx of      Physical Exam     Temp Readings from Last 3 Encounters:   01/03/18 97.7  F (36.5  C) (Oral)   11/14/17 97.7  F (36.5  C) (Oral)   10/26/17 97.6  F (36.4  C) (Oral)     BP Readings from Last 5 Encounters:   01/03/18 170/88   12/12/17 132/86   11/14/17 158/90   10/26/17 160/82   10/19/17 128/84     Pulse Readings from Last 1 Encounters:   01/03/18 90     Resp Readings from Last 1 Encounters:   11/14/17 16     Estimated  "body mass index is 37.64 kg/(m^2) as calculated from the following:    Height as of 11/14/17: 1.676 m (5' 6\").    Weight as of this encounter: 105.8 kg (233 lb 3.2 oz).    GEN: NAD, overweight  HEENT: MMM. No oral lesions. Anicteric, non-injected sclera.  CV: S1, S2. RRR. No m/r/g.  PULM: CTA bilaterally. No w/c.  MSK: Hands, wrists, elbows, and shoulders without swelling or tenderness to palpation. Bump on the flexor tendon of the left second finger that moves with flexion/extension and causes triggering. Hips nontender to direct palpation. Knees, ankles, and feet without swelling or tenderness to palpation. Negative MTP squeeze.   NEURO: UE and LE strengths 5/5 and equal bilaterally.   SKIN: No rash  EXT: No LE edema  PSYCH: Alert. Appropriate.    Labs   RF/CCP  Recent Labs   Lab Test  11/10/15   0918  10/26/15   0850   CCPABY  <20  Interpretation:  Negative     --    RHF   --   <20     ILANA  Recent Labs   Lab Test  10/26/15   0850   JAK  <1.0  Interpretation:  Negative       CBC  Recent Labs   Lab Test  12/19/17 0914 09/27/17   1519  06/21/17   1317   WBC  4.4  6.6  7.5   RBC  3.95  3.41*  4.21   HGB  11.8  10.7*  12.8   HCT  35.6  32.3*  38.7   MCV  90  95  92   RDW  15.7*  15.2*  16.0*   PLT  310  323  308   MCH  29.9  31.4  30.4   MCHC  33.1  33.1  33.1   NEUTROPHIL  59.2  62.3  68.5   LYMPH  20.5  20.1  18.0   MONOCYTE  6.9  9.5  7.3   EOSINOPHIL  12.9  7.6  5.8   BASOPHIL  0.5  0.5  0.4   ANEU  2.6  4.1  5.2   ALYM  0.9  1.3  1.4   KOKO  0.3  0.6  0.6   AEOS  0.6  0.5  0.4   ABAS  0.0  0.0  0.0     CMP  Recent Labs   Lab Test  12/19/17 0914  09/27/17   1519  06/28/17   1221  06/21/17   1317  03/21/17   1526   11/23/16   0917   10/26/15   0850  10/15/14   0704   03/11/13   1418  02/12/13   0722   NA   --    --   143   --    --    --   141   --   141  137   --   135  137   POTASSIUM   --    --   4.3   --    --    --   4.3   --   3.7  3.7   < >  4.1  3.7   CHLORIDE   --    --   105   --    --    --   103   " --   105  102   --   98  97   CO2   --    --   29   --    --    --   30   --   25  28   --   29  30   ANIONGAP   --    --   9   --    --    --   8   --   11  7   --   8  10   GLC   --    --   87   --    --    --   97   --   93  241*   --   114*  137*   BUN   --    --   16   --    --    --   16   --   22  15   --   21  16   CR  0.78  0.87  0.99  1.12*  0.81   < >  0.81   < >  0.82  0.69   --   0.74  0.72   GFRESTIMATED  76  67  57*  50*  72   < >  72   < >  71  88   --   82  84   GFRESTBLACK  >90  81  69  60*  88   < >  88   < >  86  >90   GFR Calc     --   >90  >90   OG   --    --   9.9   --    --    --   9.5   --   10.0  9.3   --   9.2  9.7   BILITOTAL  0.5  0.3   --   0.4  0.3   < >   --    < >  0.3  0.5   --    --   0.4   ALBUMIN  3.8  3.9   --   4.1  3.9   < >   --    < >  4.1  3.9   --    --   3.7*   PROTTOTAL  7.3  7.1   --   7.4  7.4   < >   --    < >  7.4  7.6   --    --   6.9   ALKPHOS  74  80   --   70  71   < >   --    < >  79  127   --    --   102   AST  32  27   --   29  26   < >   --    < >  25  33   --    --   33   ALT  34  37   --   36  30   < >   --    < >  30  39   --    --   34    < > = values in this interval not displayed.     HgA1c  Recent Labs   Lab Test  12/19/17   0916  08/16/17   1529  11/23/16 0917   A1C  6.4*  5.7  6.0     Uric Acid  Recent Labs   Lab Test  10/26/15   0850  07/26/11   1504   URIC  4.5  6.5     Iron Studies  Recent Labs   Lab Test  12/19/17   0916   MARIBEL  31   IRON  68   FEB  328   IRONSAT  21     Calcium/VitaminD  Recent Labs   Lab Test  06/28/17   1221  11/23/16   0917  11/10/15   0918  10/26/15   0850   02/12/13   0722  12/20/11   0829   12/16/09   0759   OG  9.9  9.5   --   10.0   < >  9.7  10.1   < >  9.9   D3VIT   --    --    --    --    --    --   26   --   29   VITDT   --    --   50   --    --   23*   --    --    --     < > = values in this interval not displayed.     ESR/CRP  Recent Labs   Lab Test  12/19/17   0914  09/27/17   1519  09/07/16    1622   SED  11  13  11   CRP  3.1  <2.9  <2.9     TSH/T4  Recent Labs   Lab Test  12/19/17   0916  10/26/15   0850  10/15/14   0704   TSH  2.49  1.76  2.35     Hepatitis B  Recent Labs   Lab Test  12/21/15   0844   AUSAB  0.12   HBCAB  Nonreactive   HEPBANG  Nonreactive     Hepatitis C  Recent Labs   Lab Test  12/21/15   0844   HCVAB  Nonreactive   Assay performance characteristics have not been established for newborns,   infants, and children       HIV Screening  Recent Labs   Lab Test  12/21/15   0844   HIAGAB  Nonreactive   HIV-1 p24 Ag & HIV-1/HIV-2 Ab Not Detected       Immunization History     Immunization History   Administered Date(s) Administered     Pneumo Conj 13-V (2010&after) 06/01/2016     Pneumococcal 23 valent 09/07/2016     TD (ADULT, 7+) 12/06/1995, 12/06/2006     TDAP Vaccine (Adacel) 08/16/2017     Procedure     Procedure: Steroid injection near the A1 pulley of the left second finger   Indication: Left second digit trigger finger     The procedure was explained in detail. Risks including infection, pain, structural damage such as cartilage damage and tendon rupture, and medication reaction were explained.  The importance of resting the affected hand for the next 4 days was stressed.  The option of not doing the procedure was also provided. All questions were answered and the patient consented to the procedure.     A time-out was performed and the correct patient, procedure, and laterality were verified.    The left second digit was examined and location for injection was identified to be on the palmar aspect of the left hand just proximal to the second MCP at the location of the A1 pulley. The area was cleaned with chlorhexidine, twice.  Ethyl chloride was then used for topical anaesthetic. Then a mixture of lidocaine 1% 0.05mL and Depo-Medrol 10mg (0.25mL) was injected near the tendon sheath at the A1 pulley.     The patient tolerated the procedure well. No complications.    MEDICATION: Depo  Medrol 40mg  LOT #: [id number]  : [employer]  EXPIRATION DATE: 09/01/2018  NDC#: 9831-0637-66    1% Lidocaine  : [employer]  Lot #: [id number]  EXPIRATION DATE: 05/01/2019  NDC: 8492-8280-42          Chart documentation done in part with Dragon Voice recognition Software. Although reviewed after completion, some word and grammatical error may remain.    Chris Capellan MD

## 2020-02-04 NOTE — DISCHARGE SUMMARY
Discharge Summary  Hospitalist Service    Paty Grajeda MRN# [id number]   YOB: 1945 Age: 74 year old     Date of Admission:  2/3/2020  Date of Discharge:  2/4/2020  Admitting Physician:  Paty Duque MD  Discharge Physician: Paty Duque MD  Discharging Service: Hospitalist Service     Primary Provider: [facility], [place]  Primary Care Physician Phone Number: [phone]         Discharge Diagnoses/Problem Oriented Hospital Course (Providers):    Paty Grajeda was admitted on 2/3/2020 by Paty Duque MD and I would refer you to their history and physical.  The following problems were addressed during her hospitalization:      AF with RVR  Respiratory distress  Concern for ACS  Transient hypotension  Transient junctional rhythm  Urinary retention  History of a stress cardiomyopathy  Hypertension  Hyperlipidemia  Hypothyroidism  Impaired fasting glucose  Known prior adrenal adenoma  History of herpes keratitis           Code Status:      Full Code        Brief Hospital Stay Summary Sent Home With Patient in AVS:       Summary of Stay: Paty Grajeda is a 74 year old female with a history of htn/hlp, impaired fasting glucose, chronic thrombocytopenia with platelets in the low 100's, herpes keratitis (who I believe is on suppressive acyclovir), hypothyroidism who presented to the emergency room with shortness of breath and chest pain.  Symptoms started about an hour after she was participating in a water aerobics class.     She was seen in a urgent care at which time an EKG was completed showing A. fib with RVR and she was sent into our emergency room for further evaluation.  On arrival to the ER she became hypotensive, EKG showed some diffuse ST depression and A. fib with RVR.  She ended up becoming hypotensive causing respiratory distress and so was intubated.  Complicated by right mainstem intubation which is been corrected.       Given above, Cath Lab was activated for possible STEMI ,  and she was brought over for emergent angiography.  Limited echo showed preserved ejection fraction, and angiogram was negative for any obstructive lesions.  During the angiography she developed a junctional rhythm when trying to access the right coronary artery.  She was mildly hypotensive and initiated on dopamine.     She was admitted and brought up to the intensive care unit after her procedure intubated, on propofol, and dopamine.        Problem List:   1. A. fib with RVR: She spontaneously cardioverted and remains in normal sinus rhythm at this time.  Will check TSH, no significant valvular disease seen on limited echocardiogram.  Check complete echo.  No evidence of acute coronary syndrome.  Will need to inquire about drinking habits.  Unknown risk factors for possible PE, admission chest abdomen and pelvis CT completed but not intervention that can rule out PE.  Will check Dopplers  tonight.  Hard to give additional contrast at this time after she has had IV contrast CAP CT on admission, and then subsequent coronary angiography.  Has evidence of pulmonary congestion likely from A. fib with RVR which will interfere with nuc med testing.  Empiric IV heparin overnight for A. fib.  Chads vasc 2 is 3 so clearly a candidate for anticoagulation.    2. Respiratory distress necessitating intubation in the ER.  Currently lungs are clear on exam, she is on mechanical ventilation.  Wean oxygen as able, weaning trial tonight and tomorrow in anticipation of extubation soon.  3. Hypotension: Secondary to A. fib with RVR although is persisting and now in part due to propofol, currently on low-dose dopamine at 5 mg,  4. Junctional rhythm: Precipitated during angiography: Resolved.  Currently in normal sinus rhythm.  Continue to monitor with telemetry.  5. Urinary retention: Was leaking urine on presentation to the ICU, Schumacher placed with over a liter out.  Leave Schumacher in place for now, trial of void tomorrow  6. History of a  stress cardiomyopathy: On review of epic looks like it was felt to be induced by hypertensive urgency.  Apparently resolved by echocardiogram  7. Hypertension: PTA was on lisinopril 10 and metoprolol XL 25 mg p.o. twice daily per my review of epic, awaiting formal medication reconciliation.  Hypotension has resolved and she is off the dopamine drip.    After she had converted her heart rates actually were in the low side in the 60s and even down into the 50s.  She did not receive her beta-blocker while she was in hospital.  I am concerned that if she goes out on her beta-blocker without close monitoring it is quite possible that we could precipitate a syncopal spell which would be dangerous in the setting of initiation of anticoagulation.  Her daughter, who is a nurse, and the patient, feel well checking pulses and I have requested that they hold the metoprolol if her heart rate is less than 60.  8. HLP: She apparently is on atorvastatin 20 mg daily, will continue on formal medication reconciliation has been completed  9. Hypothyroidism looks to be on levothyroxine at 75 mcg p.o. daily, would resume when medication reconciled  10. Impaired fasting glucose: Monitor with insulin sliding scale, glucoses are elevated likely stress reaction  11. Known adrenal adenoma- monitored in the outpatient setting re-documented on admission CT scan  12. History of herpes keratitis: At least in the past had been on suppressive acyclovir, awaiting formal med rec to continue if still taking it  DVT Prophylaxis: Heparin drip  Code Status: Full Code  Functional Status: Independent  Schumacher: Placed for urinary retention             Important Results:      As noted below         Pending Results:        Unresulted Labs Ordered in the Past 30 Days of this Admission     No orders found for last 31 day(s).            Discharge Instructions and Follow-Up:      Follow-up Appointments     Follow-up and recommended labs and tests       Have your  INR checked in 2 days at your regular clinic  F/U with your primary MD this week   You will be sent out with a heart monitor    Monitor your heart rate and hold your metoprolol if it is less than 60 bpm    F/U with your regular [facility] cardiologist in the next 2-3 weeks               Discharge Disposition:      Discharged to home         Discharge Medications:        Current Discharge Medication List      START taking these medications    Details   enoxaparin ANTICOAGULANT (LOVENOX) 60 MG/0.6ML syringe Inject 0.6 mLs (60 mg) Subcutaneous 2 times daily for 5 days Or until INR is therapeutic between 2-3  Qty: 12 Syringe, Refills: 0    Associated Diagnoses: Paroxysmal atrial fibrillation (H)      warfarin ANTICOAGULANT (COUMADIN) 5 MG tablet Take 1 tablet (5 mg) by mouth daily  Qty: 30 tablet, Refills: 0    Associated Diagnoses: Paroxysmal atrial fibrillation (H)         CONTINUE these medications which have NOT CHANGED    Details   acetaminophen (TYLENOL) 500 MG tablet Take 1,000 mg by mouth nightly as needed for mild pain       Calcium Carbonate-Vitamin D3 (CALCIUM 600-D) 600-400 MG-UNIT TABS Take 1 tablet by mouth 2 times daily       clobetasol (TEMOVATE) 0.05 % external ointment Apply topically once a week       fluticasone (FLONASE) 50 MCG/ACT nasal spray Spray 1 spray into both nostrils nightly as needed       levothyroxine (SYNTHROID/LEVOTHROID) 75 MCG tablet Take 75 mcg by mouth daily      metoprolol succinate ER (TOPROL-XL) 25 MG 24 hr tablet Take 25 mg by mouth 2 times daily       prednisoLONE acetate (PRED FORTE) 1 % ophthalmic suspension Place 1 drop Into the left eye every evening      acyclovir (ZOVIRAX) 400 MG tablet Take 400 mg by mouth 2 times daily      atorvastatin (LIPITOR) 40 MG tablet Take 0.5 tablets (20 mg) by mouth daily  Qty: 30 tablet, Refills: 0    Associated Diagnoses: Hyperlipidemia LDL goal <100      lisinopril (PRINIVIL,ZESTRIL) 10 MG tablet Take 1 tablet (10 mg) by mouth daily  Qty: 30  "tablet, Refills: 0    Associated Diagnoses: Essential hypertension      Vitamin D, Cholecalciferol, 50 MCG (2000 UT) CAPS Take 1 capsule by mouth daily                Allergies:       No Known Allergies        Consultations This Hospital Stay:      Consultation during this admission received from cardiology         Condition and Physical on Discharge:      Discharge condition: Stable   Vitals: Blood pressure 131/65, pulse 59, temperature 99.3  F (37.4  C), resp. rate 12, height 1.575 m (5' 2\"), weight 68.3 kg (150 lb 9.2 oz), SpO2 97 %.     Constitutional: Pleasant nad looks stated age head nc/at sclera clear   Lungs: ctab nl effort    Cardiovascular: rrr no mrg no le edema   Abdomen: S\Nt\ND   Skin:  Warm and dry no cyanosis or clubbing of the extremities   Other:  Affect appropriate she is alert and oriented and ambulating without difficulty         Discharge Time:      Greater than 30 minutes.        Image Results From This Hospital Stay (For Non-EPIC Providers):        Results for orders placed or performed during the hospital encounter of 02/03/20   XR Chest Port 1 View    Narrative    CHEST ONE VIEW PORTABLE   2/3/2020 11:28 AM     HISTORY: Chest pain.    COMPARISON: 4/30/2016.      Impression    IMPRESSION: No airspace consolidation, pneumothorax, or pleural  effusion.    ILYA COOK MD   CT Chest/Abdomen/Pelvis w Contrast    Narrative    CT CHEST/ABDOMEN/PELVIS WITH CONTRAST   2/3/2020 11:50 AM     HISTORY: Chest and back pain, hypotension.    TECHNIQUE:  82mL [id number]. CT images of the chest, abdomen, and  pelvis after nonionic intravenous contrast. Radiation dose for this  scan was reduced using automated exposure control, adjustment of the  mA and/or kV according to patient size, or iterative reconstruction  technique.    COMPARISON: 4/30/2016.    FINDINGS:     Chest: No evidence of acute thoracic aortic abnormality. No  pneumothorax. No pleural or pericardial effusion. No significant  airspace " consolidation. Moderate interstitial thickening present along  with vascular congestion. No pleural or pericardial effusion. No  pneumothorax. No pulmonary nodule or mass. No thoracic or axillary  adenopathy.    Abdomen and pelvis: At the posterior margin of the RIGHT lobe of  liver, there is a 3.2 cm low-density lesion with discontinuous  peripheral lobular enhancement that is unchanged from prior study and  compatible with a hemangioma. No other liver lesions are demonstrated.  The gallbladder, spleen, pancreas, and RIGHT adrenal gland are  unremarkable. There is a 2.2 cm LEFT adrenal nodule that is unchanged  from prior. No hydronephrosis or evidence of solid renal mass. There  are numerous parapelvic cysts in the LEFT kidney. No abdominal or  retroperitoneal lymphadenopathy. Mild nonaneurysmal aortic  atherosclerosis. No pelvic lymphadenopathy, free fluid, or mass. The  uterus is absent. There is sigmoid diverticulosis without evidence of  acute diverticulitis. The appendix is normal.    No lytic or blastic bone lesions.      Impression    IMPRESSION:  1. Pulmonary interstitial thickening along with vascular congestion  suggestive of CHF.  2. Stable hemangioma in the RIGHT lobe of the liver.  3. Stable LEFT adrenal adenoma.    ILYA COOK MD   CT Head w/o Contrast    Narrative    CT SCAN OF THE HEAD WITHOUT CONTRAST   2/3/2020 11:52 AM     HISTORY: Headache.    TECHNIQUE:  Axial images of the head and coronal reformations without  IV contrast material. Radiation dose for this scan was reduced using  automated exposure control, adjustment of the mA and/or kV according  to patient size, or iterative reconstruction technique.    COMPARISON: None.    FINDINGS: The examination is mildly limited due to motion artifact,  particularly at the region of the centrum semiovale and frontoparietal  vertex. The ventricles appear normal in size and configuration. There  is mild global brain parenchymal volume loss. Mild  patchy  hypoattenuation in the periventricular and deep cerebral white matter  likely reflects chronic small vessel ischemic disease. No definite  acute intracranial hemorrhage, extra-axial fluid collection, mass  lesion, mass effect or shift/herniation. The basal cisterns are  patent.    Bilateral lens replacements. Orbits otherwise normal. The visualized  paranasal sinuses are free of significant disease. The visualized  portions of the mastoid and middle ear cavities appear clear. Small  foci of air in the region of the left cavernous sinus and sella  (presumably within intercavernous sinuses), probably due to recent  venipuncture. Similarly, small foci of air in the left infrazygomatic   space and left preauricular region are also likely due to  recent venipuncture. The bony calvarium and bones of the skull base  appear intact.       Impression    IMPRESSION:  Mildly motion-limited exam without definite findings of  acute intracranial abnormality.    BECK VAN MD   XR Chest Port 1 View     Value    Radiologist flags Malpositioned endotracheal tube. (AA)    Narrative    CHEST ONE VIEW PORTABLE   2/3/2020 3:00 PM     HISTORY:  Chest pain.    COMPARISON: None.      Impression    IMPRESSION: Right mainstem intubation with the tip of the endotracheal  tube approximately 1 cm beyond the marek. No pneumothorax or pleural  effusion. No airspace consolidation.    [Critical Result: Malpositioned endotracheal tube.]    Finding was identified on 2/3/2020 3:04 PM.     Lolis, the nurse taking care of the patient, was contacted by me on  2/3/2020 3:08 PM and verbalized understanding of the critical result.     ILYA COOK MD   US Lower Extremity Venous Duplex Bilateral    Narrative    US BILATERAL LOWER EXTREMITY VENOUS DUPLEX ULTRASOUND  2/3/2020 5:37  PM     HISTORY: Bilateral lower extremity swelling. Extended bedrest.     FINDINGS: The deep veins in the right and left lower extremity are  compressible  throughout. The deep veins demonstrate normal venous  augmentation, waveforms and color Doppler flow. No evidence of  superficial thrombophlebitis.      Impression    IMPRESSION: No evidence of DVT.    LULY SALGADO MD   Echocardiogram Limited    Narrative    [id number]  [id number]  [id number]  [id number]^SHARLENE^LUCINA^HARVEY           [facility]  Echocardiography Laboratory  [address] [place] [address]        Name: JAIDA CLEMONS  MRN: [id number]  : 1945  Study Date: 2020 12:30 PM  Age: 74 yrs  Gender: Female  Patient Location: [facility]  Reason For Study: Afib  Ordering Physician: LUCINA SU  Referring Physician: [facility] [place]  Performed By: Autumn Awad RDCS     BSA: 1.7 m2  Height: 63 in  Weight: 154 lb  HR: 63  BP: 62/48 mmHg  _____________________________________________________________________________  __        Procedure  Limited Portable Echo Adult. (Emergent exam, abbreviated study performed).  _____________________________________________________________________________  __        Interpretation Summary     Left ventricular systolic function is normal.  The visual ejection fraction is estimated at 60-65%.  There is moderate (2+) mitral regurgitation.  There is moderate (2+) tricuspid regurgitation.  Right ventricular systolic pressure is elevated, consistent with mild to  moderate pulmonary hypertension.  The study was technically limited.  _____________________________________________________________________________  __        Left Ventricle  Left ventricular systolic function is normal. The visual ejection fraction is  estimated at 60-65%. Regional wall motion abnormalities cannot be excluded due  to limited visualization.     Right Ventricle  The right ventricle is normal size. The right ventricular systolic function is  normal.     Mitral Valve  There is mild mitral annular calcification. The mitral valve leaflets are  mildly thickened. There is  moderate (2+) mitral regurgitation.     Tricuspid Valve  There is moderate (2+) tricuspid regurgitation. The right ventricular systolic  pressure is approximated at 31.3 mmHg plus the right atrial pressure. IVC  diameter >2.1 cm collapsing <50% with sniff suggests a high RA pressure  estimated at 15 mmHg or greater. Right ventricular systolic pressure is  elevated, consistent with mild to moderate pulmonary hypertension.        Aortic Valve  The aortic valve is not well visualized. There is physiologic aortic  regurgitation.     Pericardium  There is no pericardial effusion.     Rhythm  Sinus rhythm was noted.  _____________________________________________________________________________  __           Doppler Measurements & Calculations  TR max donald: 279.9 cm/sec  TR max P.3 mmHg              _____________________________________________________________________________  __        Report approved by: Jak Ye 2020 01:35 PM      Echocardiogram Complete    Narrative    [id number]  [id number]  [id number]  [id number]^JENNY^JAIDA^T           [facility]  Echocardiography Laboratory  [address] [place] [address]        Name: KACI JAIDA M  MRN: [id number]  : 1945  Study Date: 2020 08:23 AM  Age: 74 yrs  Gender: Female  Patient Location: [facility]  Reason For Study: Afib  Ordering Physician: JAIDA ALVARADO  Performed By: Nadeen Harper     BSA: 1.7 m2  Height: 62 in  Weight: 150 lb  HR: 60  BP: 135/57 mmHg  _____________________________________________________________________________  __        Procedure  Complete Portable Echo Adult. Optison (NDC #6982-4270) given intravenously.  Technically difficult study.Extremely difficult acoustic windows despite the  use of contrast for endcardial border definition.  _____________________________________________________________________________  __        Interpretation Summary     Left ventricular systolic function is normal.  The visual  ejection fraction is estimated at 60-65%.  There is mild to moderate (1-2+) mitral regurgitation.  EF unchanged compared to prior study from 2/3/20. The study was technically  difficult.  _____________________________________________________________________________  __        Left Ventricle  The left ventricle is normal in size. Left ventricular systolic function is  normal. The visual ejection fraction is estimated at 60-65%. Left ventricular  diastolic function is indeterminate. No regional wall motion abnormalities  noted.     Right Ventricle  The right ventricle is normal size. The right ventricular systolic function is  normal.     Atria  The left atrium is moderately dilated. Right atrial size is normal.     Mitral Valve  There is mild to moderate (1-2+) mitral regurgitation.        Tricuspid Valve  There is trace tricuspid regurgitation. The right ventricular systolic  pressure is approximated at 29.2 mmHg plus the right atrial pressure. IVC  diameter >2.1 cm collapsing <50% with sniff suggests a high RA pressure  estimated at 15 mmHg or greater.     Aortic Valve  The aortic valve is trileaflet. No aortic regurgitation is present. No aortic  stenosis is present.     Pulmonic Valve  The pulmonic valve is not well visualized.     Vessels  The aortic root is not well visualized.     Pericardium  There is no pericardial effusion.        Rhythm  The rhythm was sinus bradycardia.  _____________________________________________________________________________  __  MMode/2D Measurements & Calculations     LA dimension: 3.4 cm  LVOT diam: 2.0 cm  LVOT area: 3.2 cm2  LA Volume (BP): 71.0 ml  LA Volume Index (BP): 42.0 ml/m2        Doppler Measurements & Calculations  MV E max donald: 82.1 cm/sec  MV A max donald: 56.4 cm/sec  MV E/A: 1.5  MV max P.1 mmHg  MV mean P.4 mmHg  MV V2 VTI: 30.5 cm  MVA(VTI): 2.0 cm2  MV P1/2t max donald: 102.6 cm/sec  MV P1/2t: 86.6 msec  MVA(P1/2t): 2.5 cm2  MV dec slope: 346.7 cm/sec2  MV  dec time: 0.16 sec  Ao V2 max: 137.6 cm/sec  Ao max P.0 mmHg  Ao V2 mean: 93.3 cm/sec  Ao mean P.0 mmHg  Ao V2 VTI: 29.2 cm  RACIEL(I,D): 2.1 cm2  RACIEL(V,D): 2.2 cm2     LV V1 max PG: 3.6 mmHg  LV V1 max: 95.1 cm/sec  LV V1 VTI: 18.9 cm  CO(LVOT): 3.7 l/min  CI(LVOT): 2.2 l/min/m2  SV(LVOT): 61.1 ml  SI(LVOT): 36.1 ml/m2  TR max yoav: 270.3 cm/sec  TR max P.2 mmHg  AV Yoav Ratio (DI): 0.69  RACIEL Index (cm2/m2): 1.2  E/E' av.0  Lateral E/e': 9.4  Medial E/e': 12.7           _____________________________________________________________________________  __           Report approved by: Jak Ye 2020 12:08 PM      Cardiac Catheterization    Narrative    1. Normal coronary arteries  2. Moderately elevated left heart filling pressure (LVEDP 22 mmHg)           Most Recent Lab Results In EPIC (For Non-EPIC Providers):    Most Recent 3 CBC's:  Recent Labs   Lab Test 20  1640   WBC 9.0 8.3 10.2   HGB 12.3 13.0 13.6   MCV 96 96 97   PLT 88* 93* 101*      Most Recent 3 BMP's:  Recent Labs   Lab Test 20  1101   * 146* 140   POTASSIUM 3.5 3.9 4.0   CHLORIDE 118* 117* 111*   CO2 21 22 16*   BUN 16 16 21   CR 0.84 0.79 1.13*   ANIONGAP 8 7 13   JULIAN 7.6* 7.5* 8.6   GLC 91 110* 333*     Most Recent 2 LFT's:  Recent Labs   Lab Test 20  0405   AST 72*   ALT 84*   ALKPHOS 56   BILITOTAL 0.5     Most Recent Cholesterol Panel:  Recent Labs   Lab Test 16  0642   CHOL 216*   *   HDL 81   TRIG 93     Most Recent TSH, T4 and HgbA1c:   Recent Labs   Lab Test 20  1500 16  0530   TSH 2.66  --    T4  --  1.14   A1C 5.9*  --

## 2020-02-04 NOTE — PHARMACY
Anticoagulation coverage check.  Patient has Medicare D through [facility] with $435 (of $435) unmet deductible.    Xarelto/Eliquis  Feb:  Upon receipt of RX, [facility] can dispense 1 month free.  March: $471  (fulfills $435 deductible)  April-Nov: $37/mo  Dec: $111/mo (coverage gap)    Pradaxa is non-formulary and more expensive.    Jantoven (warfarin)  $5/mo      -BEATRIZ Mccarthy, Pharmacy Technician/Liaison, [facility] *[phone]

## 2020-02-04 NOTE — PHARMACY-ADMISSION MEDICATION HISTORY
Admission medication history interview status for this patient is complete. See [facility] admission navigator for allergy information, prior to admission medications and immunization status.     Medication history interview source(s):Patient ad spouse  Medication history resources (including written lists, pill bottles, clinic record):Medlist provided by patient  Primary pharmacy:[facility]    Changes made to PTA medication list:  Added:Toprol XL  Deleted: Lopressor, omeprazole  Changed: Tylenol 325-650->1g PRN, clobetasol ointment every other day to once weekly, levothyroxine 88mcg->75 mcg, Vitamin D 25 mcg->50 mcg    Actions taken by pharmacist (provider contacted, etc):None     Additional medication history information: Patient only had her levothyroxine yesterday 2/3. All other meds were taken 2 days ago.    Medication reconciliation/reorder completed by provider prior to medication history?  No     Do you take OTC medications (eg tylenol, ibuprofen, fish oil, eye/ear drops, etc)? Yes     For patients on insulin therapy: No      Prior to Admission medications    Medication Sig Last Dose Taking? Auth Provider   acetaminophen (TYLENOL) 500 MG tablet Take 1,000 mg by mouth nightly as needed for mild pain  Past Week at na Yes Unknown, Entered By History   Calcium Carbonate-Vitamin D3 (CALCIUM 600-D) 600-400 MG-UNIT TABS Take 1 tablet by mouth 2 times daily  2/2/2020 at na Yes Unknown, Entered By History   clobetasol (TEMOVATE) 0.05 % external ointment Apply topically once a week  Past Week at na Yes Unknown, Entered By History   fluticasone (FLONASE) 50 MCG/ACT nasal spray Spray 1 spray into both nostrils nightly as needed  Past Week at na Yes Unknown, Entered By History   levothyroxine (SYNTHROID/LEVOTHROID) 75 MCG tablet Take 75 mcg by mouth daily 2/3/2020 at am Yes Unknown, Entered By History   metoprolol succinate ER (TOPROL-XL) 25 MG 24 hr tablet Take 25 mg by mouth 2 times daily  2/2/2020 at na Yes  Unknown, Entered By History   prednisoLONE acetate (PRED FORTE) 1 % ophthalmic suspension Place 1 drop Into the left eye every evening 2/2/2020 at  Yes Unknown, Entered By History   acyclovir (ZOVIRAX) 400 MG tablet Take 400 mg by mouth 2 times daily 2/2/2020 at   Unknown, Entered By History   atorvastatin (LIPITOR) 40 MG tablet Take 0.5 tablets (20 mg) by mouth daily 2/2/2020 at   Montana Orozco MD, MD   lisinopril (PRINIVIL,ZESTRIL) 10 MG tablet Take 1 tablet (10 mg) by mouth daily 2/2/2020 at   Montana Orozco MD, MD   Vitamin D, Cholecalciferol, 50 MCG (2000 UT) CAPS Take 1 capsule by mouth daily  2/2/2020 at   Unknown, Entered By History

## 2022-08-19 ENCOUNTER — HOSPITAL ENCOUNTER (INPATIENT)
Facility: CLINIC | Age: 77
LOS: 1 days | Discharge: HOME | DRG: 395 | End: 2022-08-20
Attending: EMERGENCY MEDICINE | Admitting: HOSPITALIST
Payer: COMMERCIAL

## 2022-08-19 ENCOUNTER — APPOINTMENT (OUTPATIENT)
Dept: CT IMAGING | Facility: CLINIC | Age: 77
DRG: 395 | End: 2022-08-19
Attending: EMERGENCY MEDICINE
Payer: COMMERCIAL

## 2022-08-19 DIAGNOSIS — K46.0 INCARCERATED HERNIA: ICD-10-CM

## 2022-08-19 DIAGNOSIS — K56.609 SMALL BOWEL OBSTRUCTION (H): ICD-10-CM

## 2022-08-19 LAB
ALBUMIN SERPL BCG-MCNC: 4.2 G/DL (ref 3.5–5.2)
ALBUMIN UR-MCNC: NEGATIVE MG/DL
ALP SERPL-CCNC: 64 U/L (ref 35–104)
ALT SERPL W P-5'-P-CCNC: 24 U/L (ref 10–35)
ANION GAP SERPL CALCULATED.3IONS-SCNC: 10 MMOL/L (ref 7–15)
APPEARANCE UR: CLEAR
AST SERPL W P-5'-P-CCNC: 24 U/L (ref 10–35)
BACTERIA #/AREA URNS HPF: ABNORMAL /HPF
BASOPHILS # BLD AUTO: 0 10E3/UL (ref 0–0.2)
BASOPHILS NFR BLD AUTO: 1 %
BILIRUB SERPL-MCNC: 0.6 MG/DL
BILIRUB UR QL STRIP: NEGATIVE
BUN SERPL-MCNC: 22.2 MG/DL (ref 8–23)
CALCIUM SERPL-MCNC: 9.6 MG/DL (ref 8.8–10.2)
CHLORIDE SERPL-SCNC: 105 MMOL/L (ref 98–107)
COLOR UR AUTO: ABNORMAL
CREAT SERPL-MCNC: 0.99 MG/DL (ref 0.51–0.95)
DEPRECATED HCO3 PLAS-SCNC: 26 MMOL/L (ref 22–29)
EOSINOPHIL # BLD AUTO: 0.1 10E3/UL (ref 0–0.7)
EOSINOPHIL NFR BLD AUTO: 2 %
ERYTHROCYTE [DISTWIDTH] IN BLOOD BY AUTOMATED COUNT: 13.8 % (ref 10–15)
GFR SERPL CREATININE-BSD FRML MDRD: 58 ML/MIN/1.73M2
GLUCOSE SERPL-MCNC: 125 MG/DL (ref 70–99)
GLUCOSE UR STRIP-MCNC: NEGATIVE MG/DL
HCT VFR BLD AUTO: 41.6 % (ref 35–47)
HGB BLD-MCNC: 13.6 G/DL (ref 11.7–15.7)
HGB UR QL STRIP: NEGATIVE
HOLD SPECIMEN: NORMAL
IMM GRANULOCYTES # BLD: 0 10E3/UL
IMM GRANULOCYTES NFR BLD: 0 %
INR PPP: 2.9 (ref 0.85–1.15)
KETONES UR STRIP-MCNC: ABNORMAL MG/DL
LACTATE SERPL-SCNC: 1.5 MMOL/L (ref 0.7–2)
LEUKOCYTE ESTERASE UR QL STRIP: NEGATIVE
LYMPHOCYTES # BLD AUTO: 0.9 10E3/UL (ref 0.8–5.3)
LYMPHOCYTES NFR BLD AUTO: 15 %
MCH RBC QN AUTO: 31.9 PG (ref 26.5–33)
MCHC RBC AUTO-ENTMCNC: 32.7 G/DL (ref 31.5–36.5)
MCV RBC AUTO: 97 FL (ref 78–100)
MONOCYTES # BLD AUTO: 0.7 10E3/UL (ref 0–1.3)
MONOCYTES NFR BLD AUTO: 12 %
NEUTROPHILS # BLD AUTO: 4 10E3/UL (ref 1.6–8.3)
NEUTROPHILS NFR BLD AUTO: 70 %
NITRATE UR QL: NEGATIVE
NRBC # BLD AUTO: 0 10E3/UL
NRBC BLD AUTO-RTO: 0 /100
PH UR STRIP: 7.5 [PH] (ref 5–7)
PLATELET # BLD AUTO: 137 10E3/UL (ref 150–450)
POTASSIUM SERPL-SCNC: 3.8 MMOL/L (ref 3.4–5.3)
PROT SERPL-MCNC: 6.2 G/DL (ref 6.4–8.3)
RBC # BLD AUTO: 4.27 10E6/UL (ref 3.8–5.2)
RBC URINE: 1 /HPF
SARS-COV-2 RNA RESP QL NAA+PROBE: NEGATIVE
SODIUM SERPL-SCNC: 141 MMOL/L (ref 136–145)
SP GR UR STRIP: 1.01 (ref 1–1.03)
UROBILINOGEN UR STRIP-MCNC: NORMAL MG/DL
WBC # BLD AUTO: 5.8 10E3/UL (ref 4–11)
WBC URINE: 2 /HPF

## 2022-08-19 PROCEDURE — 99222 1ST HOSP IP/OBS MODERATE 55: CPT | Mod: AI | Performed by: HOSPITALIST

## 2022-08-19 PROCEDURE — C9803 HOPD COVID-19 SPEC COLLECT: HCPCS

## 2022-08-19 PROCEDURE — 120N000001 HC R&B MED SURG/OB

## 2022-08-19 PROCEDURE — 85014 HEMATOCRIT: CPT | Performed by: EMERGENCY MEDICINE

## 2022-08-19 PROCEDURE — G1010 CDSM STANSON: HCPCS

## 2022-08-19 PROCEDURE — 96374 THER/PROPH/DIAG INJ IV PUSH: CPT

## 2022-08-19 PROCEDURE — 250N000009 HC RX 250: Performed by: HOSPITALIST

## 2022-08-19 PROCEDURE — 81001 URINALYSIS AUTO W/SCOPE: CPT | Performed by: EMERGENCY MEDICINE

## 2022-08-19 PROCEDURE — 99285 EMERGENCY DEPT VISIT HI MDM: CPT | Mod: 25

## 2022-08-19 PROCEDURE — 85610 PROTHROMBIN TIME: CPT | Performed by: EMERGENCY MEDICINE

## 2022-08-19 PROCEDURE — 258N000003 HC RX IP 258 OP 636: Performed by: HOSPITALIST

## 2022-08-19 PROCEDURE — 250N000011 HC RX IP 250 OP 636: Performed by: EMERGENCY MEDICINE

## 2022-08-19 PROCEDURE — 99223 1ST HOSP IP/OBS HIGH 75: CPT | Performed by: STUDENT IN AN ORGANIZED HEALTH CARE EDUCATION/TRAINING PROGRAM

## 2022-08-19 PROCEDURE — 74177 CT ABD & PELVIS W/CONTRAST: CPT | Mod: MG

## 2022-08-19 PROCEDURE — 80053 COMPREHEN METABOLIC PANEL: CPT | Performed by: EMERGENCY MEDICINE

## 2022-08-19 PROCEDURE — 82040 ASSAY OF SERUM ALBUMIN: CPT | Performed by: EMERGENCY MEDICINE

## 2022-08-19 PROCEDURE — 96376 TX/PRO/DX INJ SAME DRUG ADON: CPT

## 2022-08-19 PROCEDURE — U0003 INFECTIOUS AGENT DETECTION BY NUCLEIC ACID (DNA OR RNA); SEVERE ACUTE RESPIRATORY SYNDROME CORONAVIRUS 2 (SARS-COV-2) (CORONAVIRUS DISEASE [COVID-19]), AMPLIFIED PROBE TECHNIQUE, MAKING USE OF HIGH THROUGHPUT TECHNOLOGIES AS DESCRIBED BY CMS-2020-01-R: HCPCS | Performed by: EMERGENCY MEDICINE

## 2022-08-19 PROCEDURE — 96375 TX/PRO/DX INJ NEW DRUG ADDON: CPT

## 2022-08-19 PROCEDURE — 83605 ASSAY OF LACTIC ACID: CPT | Performed by: EMERGENCY MEDICINE

## 2022-08-19 PROCEDURE — 96361 HYDRATE IV INFUSION ADD-ON: CPT

## 2022-08-19 PROCEDURE — 36415 COLL VENOUS BLD VENIPUNCTURE: CPT | Performed by: EMERGENCY MEDICINE

## 2022-08-19 PROCEDURE — 258N000003 HC RX IP 258 OP 636: Performed by: EMERGENCY MEDICINE

## 2022-08-19 PROCEDURE — 250N000011 HC RX IP 250 OP 636: Performed by: HOSPITALIST

## 2022-08-19 RX ORDER — NALOXONE HYDROCHLORIDE 0.4 MG/ML
0.2 INJECTION, SOLUTION INTRAMUSCULAR; INTRAVENOUS; SUBCUTANEOUS
Status: DISCONTINUED | OUTPATIENT
Start: 2022-08-19 | End: 2022-08-20 | Stop reason: HOSPADM

## 2022-08-19 RX ORDER — HYDRALAZINE HYDROCHLORIDE 20 MG/ML
10 INJECTION INTRAMUSCULAR; INTRAVENOUS EVERY 6 HOURS PRN
Status: DISCONTINUED | OUTPATIENT
Start: 2022-08-19 | End: 2022-08-20 | Stop reason: HOSPADM

## 2022-08-19 RX ORDER — IOPAMIDOL 755 MG/ML
500 INJECTION, SOLUTION INTRAVASCULAR ONCE
Status: COMPLETED | OUTPATIENT
Start: 2022-08-19 | End: 2022-08-19

## 2022-08-19 RX ORDER — HYDROMORPHONE HYDROCHLORIDE 1 MG/ML
0.5 INJECTION, SOLUTION INTRAMUSCULAR; INTRAVENOUS; SUBCUTANEOUS
Status: DISCONTINUED | OUTPATIENT
Start: 2022-08-19 | End: 2022-08-20 | Stop reason: HOSPADM

## 2022-08-19 RX ORDER — ONDANSETRON 2 MG/ML
4 INJECTION INTRAMUSCULAR; INTRAVENOUS EVERY 6 HOURS PRN
Status: DISCONTINUED | OUTPATIENT
Start: 2022-08-19 | End: 2022-08-20 | Stop reason: HOSPADM

## 2022-08-19 RX ORDER — METOPROLOL TARTRATE 1 MG/ML
2.5 INJECTION, SOLUTION INTRAVENOUS EVERY 6 HOURS
Status: DISCONTINUED | OUTPATIENT
Start: 2022-08-19 | End: 2022-08-20

## 2022-08-19 RX ORDER — LIDOCAINE 40 MG/G
CREAM TOPICAL
Status: DISCONTINUED | OUTPATIENT
Start: 2022-08-19 | End: 2022-08-20 | Stop reason: HOSPADM

## 2022-08-19 RX ORDER — NALOXONE HYDROCHLORIDE 0.4 MG/ML
0.4 INJECTION, SOLUTION INTRAMUSCULAR; INTRAVENOUS; SUBCUTANEOUS
Status: DISCONTINUED | OUTPATIENT
Start: 2022-08-19 | End: 2022-08-20 | Stop reason: HOSPADM

## 2022-08-19 RX ORDER — LOSARTAN POTASSIUM 100 MG/1
100 TABLET ORAL DAILY
COMMUNITY
Start: 2022-06-28

## 2022-08-19 RX ORDER — SODIUM CHLORIDE 9 MG/ML
INJECTION, SOLUTION INTRAVENOUS CONTINUOUS
Status: DISCONTINUED | OUTPATIENT
Start: 2022-08-19 | End: 2022-08-20

## 2022-08-19 RX ORDER — FERROUS SULFATE 324(65)MG
324 TABLET, DELAYED RELEASE (ENTERIC COATED) ORAL DAILY
COMMUNITY

## 2022-08-19 RX ORDER — AMLODIPINE BESYLATE 5 MG/1
5 TABLET ORAL EVERY EVENING
COMMUNITY
Start: 2022-06-11

## 2022-08-19 RX ORDER — ONDANSETRON 2 MG/ML
4 INJECTION INTRAMUSCULAR; INTRAVENOUS EVERY 30 MIN PRN
Status: DISCONTINUED | OUTPATIENT
Start: 2022-08-19 | End: 2022-08-20 | Stop reason: HOSPADM

## 2022-08-19 RX ORDER — HYDROMORPHONE HCL IN WATER/PF 6 MG/30 ML
0.2 PATIENT CONTROLLED ANALGESIA SYRINGE INTRAVENOUS
Status: DISCONTINUED | OUTPATIENT
Start: 2022-08-19 | End: 2022-08-20 | Stop reason: HOSPADM

## 2022-08-19 RX ORDER — ONDANSETRON 4 MG/1
4 TABLET, ORALLY DISINTEGRATING ORAL EVERY 6 HOURS PRN
Status: DISCONTINUED | OUTPATIENT
Start: 2022-08-19 | End: 2022-08-20 | Stop reason: HOSPADM

## 2022-08-19 RX ADMIN — ONDANSETRON 4 MG: 2 INJECTION INTRAMUSCULAR; INTRAVENOUS at 16:32

## 2022-08-19 RX ADMIN — FAMOTIDINE 20 MG: 10 INJECTION INTRAVENOUS at 23:54

## 2022-08-19 RX ADMIN — ONDANSETRON 4 MG: 2 INJECTION INTRAMUSCULAR; INTRAVENOUS at 23:54

## 2022-08-19 RX ADMIN — PHYTONADIONE 10 MG: 10 INJECTION, EMULSION INTRAMUSCULAR; INTRAVENOUS; SUBCUTANEOUS at 20:14

## 2022-08-19 RX ADMIN — SODIUM CHLORIDE 1000 ML: 9 INJECTION, SOLUTION INTRAVENOUS at 16:32

## 2022-08-19 RX ADMIN — SODIUM CHLORIDE: 9 INJECTION, SOLUTION INTRAVENOUS at 23:25

## 2022-08-19 RX ADMIN — IOPAMIDOL 68 ML: 755 INJECTION, SOLUTION INTRAVENOUS at 17:46

## 2022-08-19 RX ADMIN — HYDROMORPHONE HYDROCHLORIDE 0.5 MG: 1 INJECTION, SOLUTION INTRAMUSCULAR; INTRAVENOUS; SUBCUTANEOUS at 18:13

## 2022-08-19 RX ADMIN — HYDROMORPHONE HYDROCHLORIDE 0.5 MG: 1 INJECTION, SOLUTION INTRAMUSCULAR; INTRAVENOUS; SUBCUTANEOUS at 16:32

## 2022-08-19 ASSESSMENT — ACTIVITIES OF DAILY LIVING (ADL)
ADLS_ACUITY_SCORE: 35

## 2022-08-19 ASSESSMENT — ENCOUNTER SYMPTOMS
ABDOMINAL PAIN: 1
VOMITING: 0
DIARRHEA: 0
HEMATURIA: 0
FEVER: 0

## 2022-08-19 NOTE — ED PROVIDER NOTES
"  History   Chief Complaint:  Abdominal Pain     The history is provided by the patient.      Paty Grajeda is a 77 year old female on Coumadin with history of GERD, hypertension, hyperlipidemia, and atrial fibrillation who presents with abdominal pain. The patient reports today onset of pain in her right lower abdomen while sitting down playing cards. She denies nausea, vomiting, hematuria, or diarrhea. She notes having a hysterectomy.     Review of Systems   Constitutional: Negative for fever.   Gastrointestinal: Positive for abdominal pain (Right lower quadrant). Negative for diarrhea and vomiting.   Genitourinary: Negative for hematuria.   All other systems reviewed and are negative.    Allergies:  Adhesives   Lisinopril     Medications:  Zovirax  Norvasc   Cozaar  Toprol XL   Synthroid   Flonase  Prinivil/Zestril  Coumadin    Past Medical History:     ACS  Atrial fibrillation   Stress induced cardiomyopathy   Hypothryoidism   Hypertension   Hyperlipidemia   Keratisis, herpetic   GERD  Lichenoid dermatitis   Osteopenia   Liver hemangioma   Adrenal adenoma   Thrombocytopenia   Cataract   History of peptic ulcer     Past Surgical History:    Cataract  CV coronary angiogram   CV left heart cath   ENT surgery   Hysterectomy     Family History:    Father: C.A.D., Abdominal aortic aneurysm, kidney disorder, & hypertension   Mother: Colon cancer, lymphoma, & hypertension    Social History:  Patient presents to the ED alone via EMS.   PCP: [facility], [place]     Physical Exam     Patient Vitals for the past 24 hrs:   BP Temp Temp src Pulse Resp SpO2 Height   08/19/22 1539 (!) 165/83 98  F (36.7  C) Oral 65 20 100 % 1.575 m (5' 2\")       Physical Exam  Constitutional:       Appearance: She is well-developed.   HENT:      Mouth/Throat:      Mouth: Mucous membranes are moist.      Pharynx: Oropharynx is clear. No oropharyngeal exudate.   Eyes:      General: No scleral icterus.     Conjunctiva/sclera: Conjunctivae " normal.      Pupils: Pupils are equal, round, and reactive to light.   Cardiovascular:      Rate and Rhythm: Normal rate and regular rhythm.      Heart sounds: Normal heart sounds. No murmur heard.    No friction rub. No gallop.   Pulmonary:      Effort: Pulmonary effort is normal. No respiratory distress.      Breath sounds: Normal breath sounds. No wheezing or rales.   Abdominal:      General: Bowel sounds are normal. There is no distension.      Palpations: Abdomen is soft. There is no mass.      Tenderness: There is abdominal tenderness. There is no right CVA tenderness or left CVA tenderness.      Comments: RLQ TTP   Musculoskeletal:         General: Normal range of motion.   Skin:     General: Skin is warm and dry.      Capillary Refill: Capillary refill takes less than 2 seconds.      Findings: No rash.   Neurological:      Mental Status: She is alert.           Emergency Department Course     Imaging:  CT Abdomen Pelvis w Contrast   Final Result   IMPRESSION:    1.  Mechanical mid small bowel obstruction related to what appears to be a low spigelian hernia within right lower quadrant.        Report per radiology    Laboratory:  Labs Ordered and Resulted from Time of ED Arrival to Time of ED Departure   COMPREHENSIVE METABOLIC PANEL - Abnormal       Result Value    Sodium 141      Potassium 3.8      Creatinine 0.99 (*)     Urea Nitrogen 22.2      Chloride 105      Carbon Dioxide (CO2) 26      Anion Gap 10      Glucose 125 (*)     Calcium 9.6      Protein Total 6.2 (*)     Albumin 4.2      Bilirubin Total 0.6      Alkaline Phosphatase 64      AST 24      ALT 24      GFR Estimate 58 (*)    ROUTINE UA WITH MICROSCOPIC REFLEX TO CULTURE - Abnormal    Color Urine Light Yellow      Appearance Urine Clear      Glucose Urine Negative      Bilirubin Urine Negative      Ketones Urine Trace (*)     Specific Gravity Urine 1.014      Blood Urine Negative      pH Urine 7.5 (*)     Protein Albumin Urine Negative       Urobilinogen Urine Normal      Nitrite Urine Negative      Leukocyte Esterase Urine Negative      Bacteria Urine Few (*)     RBC Urine 1      WBC Urine 2     CBC WITH PLATELETS AND DIFFERENTIAL - Abnormal    WBC Count 5.8      RBC Count 4.27      Hemoglobin 13.6      Hematocrit 41.6      MCV 97      MCH 31.9      MCHC 32.7      RDW 13.8      Platelet Count 137 (*)     % Neutrophils 70      % Lymphocytes 15      % Monocytes 12      % Eosinophils 2      % Basophils 1      % Immature Granulocytes 0      NRBCs per 100 WBC 0      Absolute Neutrophils 4.0      Absolute Lymphocytes 0.9      Absolute Monocytes 0.7      Absolute Eosinophils 0.1      Absolute Basophils 0.0      Absolute Immature Granulocytes 0.0      Absolute NRBCs 0.0     LACTIC ACID WHOLE BLOOD - Normal    Lactic Acid 1.5          Emergency Department Course:     Reviewed:  I reviewed nursing notes, vitals, past medical history and Care Everywhere    Assessments:  1605 I obtained history and examined the patient as noted above.   1815 I rechecked the patient and explained findings.   1820 I attempted hernia reduction but unsuccessful    Consults:  1821 General surgery consulted  1821 Hospitalist consulted  1835  of general surgery returned page    Interventions:  1632 NS, 1000 mL, IV  1632 Zofran, 4 mg, IV  1632 Dilaudid, 0.5 mg, IV    Disposition:  The patient was admitted to the hospital under the care of the hospitalist service.  to monitor patient while in ER.     Impression & Plan     Medical Decision Making:  Patient presents for evaluation of right lower quadrant pain sudden onset this afternoon.  Patient was quite tender in right lower quadrant.  Labs and IV were obtained.  CT showed small bowel obstruction related to a spigelian hernia in the right lower quadrant.  I did attempt a bedside reduction after pain medication.  It was unsuccessful.  Patient is still having pain.  General surgery is consulted for reduction of hernia.   She will require admission as well for treatment of her bowel obstruction.  I discussed the findings with the patient.  She voiced understanding of the treatment plans.  Patient is admitted to the hospital service.    Diagnosis:    ICD-10-CM    1. Small bowel obstruction (H)  K56.609    2. Incarcerated hernia  K46.0          Scribe Disclosure:  I, Priyanka Berman, am serving as a scribe at 3:58 PM on 8/19/2022 to document services personally performed by Mayur Sorto MD based on my observations and the provider's statements to me.            Mayur Sorto MD  08/19/22 3434

## 2022-08-19 NOTE — ED NOTES
"[facility]  ED Nurse Handoff Report    Paty Grajeda is a 77 year old female   ED Chief complaint: Abdominal Pain  . ED Diagnosis:   Final diagnoses:   Small bowel obstruction (H)   Incarcerated hernia     Allergies: No Known Allergies    Code Status: Full Code  Activity level - Baseline/Home:  Independent. Activity Level - Current:   Stand by Assist. Lift room needed: No. Bariatric: No   Needed: No   Isolation: No. Infection: Not Applicable.     Vital Signs:   Vitals:    08/19/22 1539 08/19/22 1600 08/19/22 1815   BP: (!) 165/83 (!) 153/82 (!) 167/39   Pulse: 65 70    Resp: 20 20 18   Temp: 98  F (36.7  C)     TempSrc: Oral     SpO2: 100% 100% 98%   Height: 1.575 m (5' 2\")         Cardiac Rhythm:  ,      Pain level:    Patient confused: No. Patient Falls Risk: No.   Elimination Status: Has voided   Patient Report - Initial Complaint: Patient arrives via EMS from home.  She reports feeling a sharp pain in her RLQ this afternoon when eating a few snacks.  History of A Fib.  ABCs intact, A&Ox4. Focused Assessment:    Cardiac Cardiac (Adult) - Cardiac WDL: .WDL except (HX A Fib) LS      15:46 Gastrointestinal Gastrointestinal - Gastrointestinal WDL: .WDL except; GI symptoms  GI Signs/Symptoms: abdominal discomfort (RLQ pain) LS     15:46 Neuro Cognitive Neuro Cognitive (Adult) - Cognitive/Neuro/Behavioral WDL: WDL   Traver Coma Scale - Best Eye Response: 4-->(E4) spontaneous  Best Motor Response: 6-->(M6) obeys commands  Best Verbal Response: 5-->(V5) oriented  Traver Coma Scale Score: 15          Tests Performed: Labs and imaging. Abnormal Results:   Labs Ordered and Resulted from Time of ED Arrival to Time of ED Departure   COMPREHENSIVE METABOLIC PANEL - Abnormal       Result Value    Sodium 141      Potassium 3.8      Creatinine 0.99 (*)     Urea Nitrogen 22.2      Chloride 105      Carbon Dioxide (CO2) 26      Anion Gap 10      Glucose 125 (*)     Calcium 9.6      Protein Total 6.2 (*)  "    Albumin 4.2      Bilirubin Total 0.6      Alkaline Phosphatase 64      AST 24      ALT 24      GFR Estimate 58 (*)    ROUTINE UA WITH MICROSCOPIC REFLEX TO CULTURE - Abnormal    Color Urine Light Yellow      Appearance Urine Clear      Glucose Urine Negative      Bilirubin Urine Negative      Ketones Urine Trace (*)     Specific Gravity Urine 1.014      Blood Urine Negative      pH Urine 7.5 (*)     Protein Albumin Urine Negative      Urobilinogen Urine Normal      Nitrite Urine Negative      Leukocyte Esterase Urine Negative      Bacteria Urine Few (*)     RBC Urine 1      WBC Urine 2     CBC WITH PLATELETS AND DIFFERENTIAL - Abnormal    WBC Count 5.8      RBC Count 4.27      Hemoglobin 13.6      Hematocrit 41.6      MCV 97      MCH 31.9      MCHC 32.7      RDW 13.8      Platelet Count 137 (*)     % Neutrophils 70      % Lymphocytes 15      % Monocytes 12      % Eosinophils 2      % Basophils 1      % Immature Granulocytes 0      NRBCs per 100 WBC 0      Absolute Neutrophils 4.0      Absolute Lymphocytes 0.9      Absolute Monocytes 0.7      Absolute Eosinophils 0.1      Absolute Basophils 0.0      Absolute Immature Granulocytes 0.0      Absolute NRBCs 0.0     LACTIC ACID WHOLE BLOOD - Normal    Lactic Acid 1.5     COVID-19 VIRUS (CORONAVIRUS) BY PCR   INR     CT Abdomen Pelvis w Contrast   Final Result   IMPRESSION:    1.  Mechanical mid small bowel obstruction related to what appears to be a low spigelian hernia within right lower quadrant.      .   Treatments provided: See MAR  Family Comments: Spouse at bedside  OBS brochure/video discussed/provided to patient:  N/A  ED Medications:   Medications   0.9% sodium chloride BOLUS (0 mLs Intravenous Stopped 8/19/22 1827)     Followed by   sodium chloride 0.9% infusion (has no administration in time range)   ondansetron (ZOFRAN) injection 4 mg (4 mg Intravenous Given 8/19/22 1632)   HYDROmorphone (PF) (DILAUDID) injection 0.5 mg (0.5 mg Intravenous Given 8/19/22  1813)   sodium chloride (PF) 0.9% PF flush 100 mL (59 mLs Intravenous Given 8/19/22 1747)   iopamidol (ISOVUE-370) solution 500 mL (68 mLs Intravenous Given 8/19/22 1746)     Drips infusing:  No  For the majority of the shift, the patient's behavior Green. Interventions performed were N/A.    Sepsis treatment initiated: No     Patient tested for COVID 19 prior to admission: YES    ED Nurse Name/Phone Number: Alyx Armijo RN,   6:43 PM    RECEIVING UNIT ED HANDOFF REVIEW    Above ED Nurse Handoff Report was reviewed: Yes  Reviewed by: Tiffani Ruiz RN on August 19, 2022 at 10:15 PM

## 2022-08-19 NOTE — ED TRIAGE NOTES
Patient arrives via EMS from home.  She reports feeling a sharp pain in her RLQ this afternoon when eating a few snacks.  History of A Fib.  ABCs intact, A&Ox4.     Triage Assessment     Row Name 08/19/22 154       Triage Assessment (Adult)    Airway WDL WDL       Respiratory WDL    Respiratory WDL WDL       Skin Circulation/Temperature WDL    Skin Circulation/Temperature WDL WDL       Cardiac WDL    Cardiac WDL WDL       Peripheral/Neurovascular WDL    Peripheral Neurovascular WDL WDL       Cognitive/Neuro/Behavioral WDL    Cognitive/Neuro/Behavioral WDL WDL

## 2022-08-20 VITALS
BODY MASS INDEX: 31.6 KG/M2 | HEART RATE: 61 BPM | OXYGEN SATURATION: 94 % | WEIGHT: 171.7 LBS | SYSTOLIC BLOOD PRESSURE: 128 MMHG | DIASTOLIC BLOOD PRESSURE: 67 MMHG | HEIGHT: 62 IN | TEMPERATURE: 98 F | RESPIRATION RATE: 18 BRPM

## 2022-08-20 DIAGNOSIS — K46.0 INCARCERATED HERNIA: Primary | ICD-10-CM

## 2022-08-20 LAB
ALBUMIN SERPL BCG-MCNC: 3.7 G/DL (ref 3.5–5.2)
ALP SERPL-CCNC: 61 U/L (ref 35–104)
ALT SERPL W P-5'-P-CCNC: 19 U/L (ref 10–35)
ANION GAP SERPL CALCULATED.3IONS-SCNC: 4 MMOL/L (ref 7–15)
AST SERPL W P-5'-P-CCNC: 19 U/L (ref 10–35)
BILIRUB SERPL-MCNC: 0.6 MG/DL
BUN SERPL-MCNC: 13.2 MG/DL (ref 8–23)
CALCIUM SERPL-MCNC: 9 MG/DL (ref 8.8–10.2)
CHLORIDE SERPL-SCNC: 111 MMOL/L (ref 98–107)
CREAT SERPL-MCNC: 0.83 MG/DL (ref 0.51–0.95)
DEPRECATED HCO3 PLAS-SCNC: 28 MMOL/L (ref 22–29)
ERYTHROCYTE [DISTWIDTH] IN BLOOD BY AUTOMATED COUNT: 13.8 % (ref 10–15)
GFR SERPL CREATININE-BSD FRML MDRD: 72 ML/MIN/1.73M2
GLUCOSE SERPL-MCNC: 108 MG/DL (ref 70–99)
HCT VFR BLD AUTO: 41.8 % (ref 35–47)
HGB BLD-MCNC: 13.2 G/DL (ref 11.7–15.7)
INR PPP: 2.83 (ref 0.85–1.15)
MCH RBC QN AUTO: 31.6 PG (ref 26.5–33)
MCHC RBC AUTO-ENTMCNC: 31.6 G/DL (ref 31.5–36.5)
MCV RBC AUTO: 100 FL (ref 78–100)
PLATELET # BLD AUTO: 120 10E3/UL (ref 150–450)
POTASSIUM SERPL-SCNC: 4.9 MMOL/L (ref 3.4–5.3)
PROT SERPL-MCNC: 5.7 G/DL (ref 6.4–8.3)
RBC # BLD AUTO: 4.18 10E6/UL (ref 3.8–5.2)
SARS-COV-2 RNA RESP QL NAA+PROBE: NEGATIVE
SODIUM SERPL-SCNC: 143 MMOL/L (ref 136–145)
WBC # BLD AUTO: 5.8 10E3/UL (ref 4–11)

## 2022-08-20 PROCEDURE — 250N000013 HC RX MED GY IP 250 OP 250 PS 637: Performed by: INTERNAL MEDICINE

## 2022-08-20 PROCEDURE — 80053 COMPREHEN METABOLIC PANEL: CPT | Performed by: HOSPITALIST

## 2022-08-20 PROCEDURE — 85610 PROTHROMBIN TIME: CPT | Performed by: HOSPITALIST

## 2022-08-20 PROCEDURE — 258N000003 HC RX IP 258 OP 636: Performed by: EMERGENCY MEDICINE

## 2022-08-20 PROCEDURE — 99239 HOSP IP/OBS DSCHRG MGMT >30: CPT | Performed by: INTERNAL MEDICINE

## 2022-08-20 PROCEDURE — 85027 COMPLETE CBC AUTOMATED: CPT | Performed by: HOSPITALIST

## 2022-08-20 PROCEDURE — 36415 COLL VENOUS BLD VENIPUNCTURE: CPT | Performed by: HOSPITALIST

## 2022-08-20 PROCEDURE — U0005 INFEC AGEN DETEC AMPLI PROBE: HCPCS | Performed by: SURGERY

## 2022-08-20 RX ORDER — PREDNISOLONE ACETATE 10 MG/ML
1 SUSPENSION/ DROPS OPHTHALMIC EVERY EVENING
Status: DISCONTINUED | OUTPATIENT
Start: 2022-08-20 | End: 2022-08-20 | Stop reason: HOSPADM

## 2022-08-20 RX ORDER — METOPROLOL SUCCINATE 25 MG/1
25 TABLET, EXTENDED RELEASE ORAL 2 TIMES DAILY
Status: DISCONTINUED | OUTPATIENT
Start: 2022-08-20 | End: 2022-08-20 | Stop reason: HOSPADM

## 2022-08-20 RX ORDER — ATORVASTATIN CALCIUM 20 MG/1
20 TABLET, FILM COATED ORAL EVERY EVENING
Status: DISCONTINUED | OUTPATIENT
Start: 2022-08-20 | End: 2022-08-20 | Stop reason: HOSPADM

## 2022-08-20 RX ORDER — WARFARIN SODIUM 5 MG/1
TABLET ORAL
COMMUNITY

## 2022-08-20 RX ORDER — WARFARIN SODIUM 5 MG/1
5 TABLET ORAL
Status: DISCONTINUED | OUTPATIENT
Start: 2022-08-20 | End: 2022-08-20 | Stop reason: HOSPADM

## 2022-08-20 RX ORDER — WARFARIN SODIUM 5 MG/1
10 TABLET ORAL DAILY
Status: DISCONTINUED | OUTPATIENT
Start: 2022-08-20 | End: 2022-08-20

## 2022-08-20 RX ORDER — LOSARTAN POTASSIUM 100 MG/1
100 TABLET ORAL DAILY
Status: DISCONTINUED | OUTPATIENT
Start: 2022-08-20 | End: 2022-08-20 | Stop reason: HOSPADM

## 2022-08-20 RX ORDER — HEPARIN SODIUM 10000 [USP'U]/ML
5000 INJECTION, SOLUTION INTRAVENOUS; SUBCUTANEOUS
Status: CANCELLED | OUTPATIENT
Start: 2022-08-20

## 2022-08-20 RX ORDER — AMLODIPINE BESYLATE 5 MG/1
5 TABLET ORAL EVERY EVENING
Status: DISCONTINUED | OUTPATIENT
Start: 2022-08-20 | End: 2022-08-20 | Stop reason: HOSPADM

## 2022-08-20 RX ORDER — LEVOTHYROXINE SODIUM 75 UG/1
75 TABLET ORAL DAILY
Status: DISCONTINUED | OUTPATIENT
Start: 2022-08-20 | End: 2022-08-20 | Stop reason: HOSPADM

## 2022-08-20 RX ADMIN — METOPROLOL SUCCINATE 25 MG: 25 TABLET, EXTENDED RELEASE ORAL at 11:17

## 2022-08-20 RX ADMIN — LEVOTHYROXINE SODIUM 75 MCG: 0.07 TABLET ORAL at 10:50

## 2022-08-20 RX ADMIN — LOSARTAN POTASSIUM 100 MG: 100 TABLET, FILM COATED ORAL at 11:17

## 2022-08-20 RX ADMIN — SODIUM CHLORIDE: 9 INJECTION, SOLUTION INTRAVENOUS at 06:21

## 2022-08-20 ASSESSMENT — ACTIVITIES OF DAILY LIVING (ADL)
WALKING_OR_CLIMBING_STAIRS_DIFFICULTY: NO
DOING_ERRANDS_INDEPENDENTLY_DIFFICULTY: NO
ADLS_ACUITY_SCORE: 18
FALL_HISTORY_WITHIN_LAST_SIX_MONTHS: NO
ADLS_ACUITY_SCORE: 18
HEARING_DIFFICULTY_OR_DEAF: NO
CHANGE_IN_FUNCTIONAL_STATUS_SINCE_ONSET_OF_CURRENT_ILLNESS/INJURY: NO
DIFFICULTY_EATING/SWALLOWING: NO
ADLS_ACUITY_SCORE: 35
DIFFICULTY_COMMUNICATING: NO
TOILETING_ISSUES: NO
CONCENTRATING,_REMEMBERING_OR_MAKING_DECISIONS_DIFFICULTY: NO
WEAR_GLASSES_OR_BLIND: NO
ADLS_ACUITY_SCORE: 18
DRESSING/BATHING_DIFFICULTY: NO
ADLS_ACUITY_SCORE: 18

## 2022-08-20 NOTE — PROGRESS NOTES
Surgery Progress    S: feeling well, very mild soreness over the hernia site. No general abdominal pain. Passing small amount of gas, no BMs    O: AF VSS  Abdomen soft, nontender, nondistended    CT reviewed - small spigelian hernia RLQ containing loop of small intestine, reduced on subsequent CT    A/P 76yo F incarcerated right spigelian hernia, s/p reduction in ER. Doing well this morning, no ongoing obstructive symptoms.  - ADAT  - discharge today if tolerating  - pt requested a COVID swab (and printout of results) prior to discharge as she is missing her appointment today for her plane flight next Tues - ordered   - we discussed signs and symptoms to watch for repeat incarceration and methods for reduction, reason to present to ER. No activity restrictions at this point but she will try to avoid straining/heavy lifting  - Our office will call her Monday to get her scheduled for hernia repair as an outpatient with Dr. Hester.    Maritza Palma MD

## 2022-08-20 NOTE — CONSULTS
General Surgery Consultation    Paty Grajeda MRN# [id number]   Age: 77 year old YOB: 1945     Date of Admission:  8/19/2022    Reason for consult:          Spigelian hernia and SBO       Requesting physician:            Dr Mayur Sorto                 Assessment and Plan:   Assessment:   #Spegilian hernia with SBO   The initial CT scan shows a SBO with a loop of small bowel with a transition point in a right lower quadrant spigelian hernia. This was able to be reduced at bedside but due to body habitus I could not be confident it was completely reduced. Therefore, a repeat CT was obtained and fortunately confirmed successful reduction of the hernia. We can avoid emergent surgery tonight.     I explained to the patient that this is a high risk hernia and I worry it could cause her issues in the near future. It should be repaired soon on a semi-elective basis as a robotic assisted hernia repair. We would plan for it to be a same day surgery. I did briefly discuss the operation, the risks and benefits, and the expected post-op course.     Although she is clinically doing well now and the repeat CT shows no obstruction I think it would be worth watching her overnight in the hospital. If she is doing well tomorrow then she can go home from my perspective and we can call her to schedule surgery. I did explain to her and her family return precautions including worsening pain, nausea, vomiting, fevers. They understood all of this well.       Plan:   -no emergent surgical intervention   -monitor overnight, if tolerating diet and clinically well tomorrow then can discharge from my perspective   -diet as tolerated   -can resume warfarin tomorrow if doing well, we will call to schedule surgery and also give instructions for stopping warfarin based on surgery date               Chief Complaint:   Abdominal pain      History is obtained from the patient.         History of Present Illness:   Paty Grajeda is a  77 year old female with a history of A fib on warfarin who presents to the ER today with acute onset of RLQ pain. The pain came on suddenly this afternoon while she was playing cards with her friends. The pain was sharp and did not radiate and did not improve. She is having nausea but denies vomiting. She has been having normal bowel movements, including today. She has never had this type of pain before and did not know she has a hernia. She has a surgical history of a hysterectomy. She denies any fevers or chills. An attempt at reduction was done in the ER and she feels a little better after that.           Past Medical History:     Past Medical History:   Diagnosis Date     Benign essential hypertension      Benign positional vertigo     resovled with PT     Herpes keratitis      Hyperlipidemia      Hypothyroidism      Impaired fasting glucose      Peptic ulcer      Stress-induced cardiomyopathy     Resolved, felt due to hypertensive urgency     Thrombocytopenia (H)              Past Surgical History:     Past Surgical History:   Procedure Laterality Date     BREAST BIOPSY, RT/LT       CATARACT IOL, RT/LT       CV CORONARY ANGIOGRAM N/A 2/3/2020    Procedure: Coronary Angiogram;  Surgeon: Oni Villela MD;  Location: [facility]     CV LEFT HEART CATH N/A 2/3/2020    Procedure: Left Heart Cath;  Surgeon: Oni Villela MD;  Location: [facility]     ENT SURGERY       HYSTERECTOMY               Social History:     Social History     Tobacco Use     Smoking status: Former Smoker     Quit date:      Years since quittin.6     Smokeless tobacco: Never Used   Substance Use Topics     Alcohol use: Yes             Family History:     Family History   Problem Relation Age of Onset     Hypertension Mother      Lymphoma Mother      Colon Cancer Mother      Coronary Artery Disease Father      Abdominal Aortic Aneurysm Father      Hypertension Father      No family history of  "bleeding or clotting disorders.         Allergies:   No Known Allergies          Medications:   No current facility-administered medications on file prior to encounter.  acetaminophen (TYLENOL) 500 MG tablet, Take 1,000 mg by mouth nightly as needed for mild pain   acyclovir (ZOVIRAX) 400 MG tablet, Take 400 mg by mouth 2 times daily  amLODIPine (NORVASC) 5 MG tablet, Take 5 mg by mouth daily  atorvastatin (LIPITOR) 40 MG tablet, Take 0.5 tablets (20 mg) by mouth daily  Calcium Carbonate-Vitamin D3 (CALCIUM 600-D) 600-400 MG-UNIT TABS, Take 1 tablet by mouth 2 times daily   clobetasol (TEMOVATE) 0.05 % external ointment, Apply topically once a week   fluticasone (FLONASE) 50 MCG/ACT nasal spray, Spray 1 spray into both nostrils nightly as needed   levothyroxine (SYNTHROID/LEVOTHROID) 75 MCG tablet, Take 75 mcg by mouth daily  losartan (COZAAR) 100 MG tablet, Take 100 mg by mouth daily  metoprolol succinate ER (TOPROL-XL) 25 MG 24 hr tablet, Take 25 mg by mouth 2 times daily   omeprazole (PRILOSEC) 20 MG DR capsule, Take 20 mg by mouth daily  prednisoLONE acetate (PRED FORTE) 1 % ophthalmic suspension, Place 1 drop Into the left eye every evening  Vitamin D, Cholecalciferol, 50 MCG (2000 UT) CAPS, Take 1 capsule by mouth daily   warfarin ANTICOAGULANT (COUMADIN) 5 MG tablet, Take 1 tablet (5 mg) by mouth daily        phytonadione  10 mg Oral Once            Review of Systems:   The 10 point review of systems is negative other than noted in the HPI.          Physical Exam:   BP (!) 150/80   Pulse 66   Temp 98  F (36.7  C) (Oral)   Resp 16   Ht 1.575 m (5' 2\")   SpO2 94%   BMI 27.54 kg/m    General - Well developed, well nourished female in no apparent distress  HEENT:  Head normocephalic and atraumatic, pupils equal and round, conjunctivae clear  Lungs: normal effort on RA   Heart: regular rate and rhythm  Abdomen: obese, soft, not distended, she is mildly tender in the RLQ to deep palpation; due to her body " habitus it is difficult to feel a hernia defect and whether or not the hernia is reduced   Extremities: Warm without edema  Neurologic: nonfocal  Psychiatric: Mood and affect appropriate  Skin: Without lesions, rashes, or jaundice         Data:     WBC -   Lab Results   Component Value Date    WBC 5.8 08/19/2022       HgB -   Lab Results   Component Value Date    HGB 13.6 08/19/2022       Plt-   Lab Results   Component Value Date     (L) 08/19/2022       Liver Function Studies -   Recent Labs   Lab Test 08/19/22  1545   PROTTOTAL 6.2*   ALBUMIN 4.2   BILITOTAL 0.6   ALKPHOS 64   AST 24   ALT 24       Lipase- No results found for: LIPASE      Imaging:  All imaging studies reviewed by me.    Results for orders placed or performed during the hospital encounter of 08/19/22   CT Abdomen Pelvis w Contrast    Narrative    EXAM: CT ABDOMEN PELVIS W CONTRAST  LOCATION: [facility]  DATE/TIME: 8/19/2022 5:50 PM    INDICATION: RLQ pain  COMPARISON: 2/3/2020  TECHNIQUE: CT scan of the abdomen and pelvis was performed following injection of IV contrast. Multiplanar reformats were obtained. Dose reduction techniques were used.  CONTRAST: 68 mL Isovue 370    FINDINGS:   LOWER CHEST: Motion artifact. Mild dependent atelectasis.    HEPATOBILIARY: 2 separate benign hemangiomata inferior right lobe of liver with a few tiny hepatic cysts. Biliary system unremarkable.    PANCREAS: Normal.    SPLEEN: Normal.    ADRENAL GLANDS: Stable 2.2 cm presumed adenoma within left adrenal gland. Stable slight calcifications within right adrenal gland.    KIDNEYS/BLADDER: Multiple renal cysts are stable in need no workup. No stone or hydronephrosis.    BOWEL: Mildly dilated fluid-filled proximal small bowel secondary to a hernia within low right lower quadrant, this appears to be a low spigelian hernia containing a short loop of dilated small bowel (coronal images 30-35; axial images 145-150; sagittal   images  32-35).  Distal small bowel is decompressed. Colon normal in caliber. Moderate diverticulosis.    LYMPH NODES: Normal.    VASCULATURE: Unremarkable.    PELVIC ORGANS: Hysterectomy. No adnexal lesions. No free fluid.    MUSCULOSKELETAL: No suspicious bony lesion. Right-sided InterStim device.      Impression    IMPRESSION:   1.  Mechanical mid small bowel obstruction related to what appears to be a low spigelian hernia within right lower quadrant.         Time spent with the patient, reviewing the EMR, reviewing laboratory and imaging studies, more than 50% of which was counseling and coordinating care:  90 minutes.     Nithin Hester MD

## 2022-08-20 NOTE — PLAN OF CARE
Goal Outcome Evaluation:        End of Shift Summary  For vital signs and complete assessments, please see documentation flowsheets.     Pertinent assessments: Up independent in room. O2 stable on RA. Denies pain. Passing gas and burping per pt report. Voiding with adequate output.      Major Shift Events: Diet advanced to fulls. Tolerating diet well. PTA meds resumed. Fluids discontinued.   Treatment Plan: Await bowel function return.     Bedside Nurse: Jolanta Guevara RN

## 2022-08-20 NOTE — PHARMACY-ANTICOAGULATION SERVICE
Clinical Pharmacy- Warfarin Discharge Note  This patient is currently on warfarin for the treatment of Atrial fibrillation.  INR Goal= 2-3  Expected length of therapy lifetime.    Warfarin PTA Regimen: 7.5 MWF, 5ROW      Anticoagulation Dose History     Recent Dosing and Labs Latest Ref Rng & Units 8/19/2022 8/20/2022    INR 0.85 - 1.15 2.90(H) 2.83(H)          Vitamin K doses administered during the last 7 days: ---  FFP administered during the last 7 days: ---    Reviewed PTA, inpt and discharge meds.  No med changes.  Agree w/MD plan to discharge pt on PTA warfarin dosing and INR check as originally planned as outpt.

## 2022-08-20 NOTE — DISCHARGE SUMMARY
Pt discharged home in stable condition via  for transportation. AVS was reviewed and all questions were answered. Pt and  verbalized understanding of discharge instructions, medications and necessary follow up appointments. All personal belongings were sent home with patient.

## 2022-08-20 NOTE — DISCHARGE INSTRUCTIONS
Follow up with Dr Hester at:  [facility]  [address] [facility] [id number]  [place], [place]  [phone]

## 2022-08-20 NOTE — H&P
[facility]    History and Physical - Hospitalist Service       Date of Admission:  8/19/2022    Assessment & Plan      Paty Grajeda is a 77 year old female ww/PMH of GERD, HTN, DLD, afib on coumadin who presents from home with severe abd pain and found to have suspected incarcerated hernia    Suspected incarcerated hernia  -s/p rapid onset of severe RLQ abd pain today w/CT showing mechanical SBO w/spigelian hernia RLQ.   -s/p possibly successful reduction by ED team, bedside US attempted but unable to verify. Discussed with general surgery team and rpt CT ordered  -pending rpt CT may need surgical intervention tonight, will reverse coumadin now w/vitamin K  -keep NPO, IVF, pain control    afib on coumadin  -reversing coumadin as above  -hold home metoprolol and change to scheduled IV with hold parameters    HX GERD, HTN, DLD  -hold all home oral meds for now  -IV pepcid and hydralazine ordered    Diet:   NPO  DVT Prophylaxis: coumadin then SCD's  Schumacher Catheter: Not present  Central Lines: None  Cardiac Monitoring: None  Code Status:   full code     The patient's care was discussed with the Patient, Patient's Family and ED and surgery teams.    Davy Lopez DO  Hospitalist Service  [facility]  Securely message with the Vocera Web Console (learn more here)  Text page via [employer] Paging/Directory   ______________________________________________________________________    Chief Complaint   abd pain    History of Present Illness   Paty Grajeda is a 77 year old female ww/PMH of GERD, HTN, DLD, afib on coumadin who presents from home with severe abd pain. Reports was in usual state of health until this afternoon when she got a severe abd pain while playing cards, wasn't exerting herself. She reports it feeling like a 'stitch' in her side. However developed some nausea and loose stools but without vomiting, CP, sob, fever or chills. When pain didn't improve she presented  to ED.    In ED had CT with SBO and related spegelian hernia in RLQ. ED physician did attempt to reduce it and patient does state that it feels better now although . Patient was then seen at bedside with general surgery and ED teams and will repeat additional CT to assess for need for surgery. Lactic was ok, INR is 2.9, reversal has been ordered.    Review of Systems    The 10 point Review of Systems is negative other than noted in the HPI or here.    Past Medical History    I have reviewed this patient's medical history and updated it with pertinent information if needed.   Past Medical History:   Diagnosis Date     Benign essential hypertension      Benign positional vertigo     resovled with PT     Herpes keratitis      Hyperlipidemia      Hypothyroidism      Impaired fasting glucose      Peptic ulcer      Stress-induced cardiomyopathy     Resolved, felt due to hypertensive urgency     Thrombocytopenia (H)        Past Surgical History   I have reviewed this patient's surgical history and updated it with pertinent information if needed.  Past Surgical History:   Procedure Laterality Date     BREAST BIOPSY, RT/LT       CATARACT IOL, RT/LT       CV CORONARY ANGIOGRAM N/A 2/3/2020    Procedure: Coronary Angiogram;  Surgeon: Oni Villela MD;  Location:  [facility] CARDIAC CATH LAB     CV LEFT HEART CATH N/A 2/3/2020    Procedure: Left Heart Cath;  Surgeon: Oni Villela MD;  Location: [facility]     ENT SURGERY       HYSTERECTOMY         Social History   I have reviewed this patient's social history and updated it with pertinent information if needed.  Social History     Tobacco Use     Smoking status: Former Smoker     Quit date:      Years since quittin.6     Smokeless tobacco: Never Used   Substance Use Topics     Alcohol use: Yes     Drug use: No       Family History   I have reviewed this patient's family history and updated it with pertinent information if  needed.  Family History   Problem Relation Age of Onset     Hypertension Mother      Lymphoma Mother      Colon Cancer Mother      Coronary Artery Disease Father      Abdominal Aortic Aneurysm Father      Hypertension Father        Prior to Admission Medications   Prior to Admission Medications   Prescriptions Last Dose Informant Patient Reported? Taking?   Calcium Carbonate-Vitamin D3 (CALCIUM 600-D) 600-400 MG-UNIT TABS   Yes No   Sig: Take 1 tablet by mouth 2 times daily    Vitamin D, Cholecalciferol, 50 MCG (2000 UT) CAPS   Yes No   Sig: Take 1 capsule by mouth daily    acetaminophen (TYLENOL) 500 MG tablet   Yes No   Sig: Take 1,000 mg by mouth nightly as needed for mild pain    acyclovir (ZOVIRAX) 400 MG tablet   Yes No   Sig: Take 400 mg by mouth 2 times daily   amLODIPine (NORVASC) 5 MG tablet   Yes No   Sig: Take 5 mg by mouth daily   atorvastatin (LIPITOR) 40 MG tablet   No No   Sig: Take 0.5 tablets (20 mg) by mouth daily   clobetasol (TEMOVATE) 0.05 % external ointment   Yes No   Sig: Apply topically once a week    fluticasone (FLONASE) 50 MCG/ACT nasal spray   Yes No   Sig: Spray 1 spray into both nostrils nightly as needed    levothyroxine (SYNTHROID/LEVOTHROID) 75 MCG tablet   Yes No   Sig: Take 75 mcg by mouth daily   losartan (COZAAR) 100 MG tablet   Yes No   Sig: Take 100 mg by mouth daily   metoprolol succinate ER (TOPROL-XL) 25 MG 24 hr tablet   Yes No   Sig: Take 25 mg by mouth 2 times daily    omeprazole (PRILOSEC) 20 MG DR capsule   Yes No   Sig: Take 20 mg by mouth daily   prednisoLONE acetate (PRED FORTE) 1 % ophthalmic suspension   Yes No   Sig: Place 1 drop Into the left eye every evening   warfarin ANTICOAGULANT (COUMADIN) 5 MG tablet   No No   Sig: Take 1 tablet (5 mg) by mouth daily      Facility-Administered Medications: None     Allergies   No Known Allergies    Physical Exam   Vital Signs: Temp: 98  F (36.7  C) Temp src: Oral BP: (!) 150/80 Pulse: 66   Resp: 16 SpO2: 94 % O2 Device:  None (Room air)    Weight: 0 lbs 0 oz    Constitutional: awake, alert, cooperative and no apparent distress  Eyes: lids and lashes normal and conjunctiva normal  ENT: normocepalic, without obvious abnormality, atramatic  Respiratory: no increased work of breathing, good air exchange and clear to auscultation  Cardiovascular: regular rate and rhythm and no murmur noted  GI: hypoactive bowel sounds, soft, non-distended and very tender to palpation RLQ  Skin: scattered small bruises on extremeties  Neurologic: alert, oriented x4, no focal deficits    Data   Data reviewed today: I reviewed all medications, new labs and imaging results over the last 24 hours.     Recent Labs   Lab 08/19/22  1545   WBC 5.8   HGB 13.6   MCV 97   *   INR 2.90*      POTASSIUM 3.8   CHLORIDE 105   CO2 26   BUN 22.2   CR 0.99*   ANIONGAP 10   JULIAN 9.6   *   ALBUMIN 4.2   PROTTOTAL 6.2*   BILITOTAL 0.6   ALKPHOS 64   ALT 24   AST 24     Most Recent 3 CBC's:Recent Labs   Lab Test 08/19/22  1545 02/04/20  0405 02/03/20  2055   WBC 5.8 9.0 8.3   HGB 13.6 12.3 13.0   MCV 97 96 96   * 88* 93*     Most Recent 3 BMP's:Recent Labs   Lab Test 08/19/22  1545 02/04/20  0405 02/03/20  2108    147* 146*   POTASSIUM 3.8 3.5 3.9   CHLORIDE 105 118* 117*   CO2 26 21 22   BUN 22.2 16 16   CR 0.99* 0.84 0.79   ANIONGAP 10 8 7   JULIAN 9.6 7.6* 7.5*   * 91 110*     Most Recent 2 LFT's:Recent Labs   Lab Test 08/19/22  1545 02/04/20  0405   AST 24 72*   ALT 24 84*   ALKPHOS 64 56   BILITOTAL 0.6 0.5     Most Recent 3 INR's:Recent Labs   Lab Test 08/19/22  1545   INR 2.90*     5.8    \    13.6    /    137 (L)   N 70    L N/A    141    105    22.2 /   ------------------------------------ 125 (H)   ALT 24   AST 24   AP 64   ALB 4.2   Ca 9.6  3.8    26    0.99 (H) \    % RETIC N/A    LDH N/A  Troponin N/A    BNP N/A    CK N/A  INR 2.90 (H)   PTT N/A    D-dimer N/A    Fibrinogen N/A    Antithrombin N/A  Ferritin N/A  CRP N/A     IL-6 N/A  Recent Results (from the past 24 hour(s))   CT Abdomen Pelvis w Contrast    Narrative    EXAM: CT ABDOMEN PELVIS W CONTRAST  LOCATION: [facility]  DATE/TIME: 8/19/2022 5:50 PM    INDICATION: RLQ pain  COMPARISON: 2/3/2020  TECHNIQUE: CT scan of the abdomen and pelvis was performed following injection of IV contrast. Multiplanar reformats were obtained. Dose reduction techniques were used.  CONTRAST: 68 mL Isovue 370    FINDINGS:   LOWER CHEST: Motion artifact. Mild dependent atelectasis.    HEPATOBILIARY: 2 separate benign hemangiomata inferior right lobe of liver with a few tiny hepatic cysts. Biliary system unremarkable.    PANCREAS: Normal.    SPLEEN: Normal.    ADRENAL GLANDS: Stable 2.2 cm presumed adenoma within left adrenal gland. Stable slight calcifications within right adrenal gland.    KIDNEYS/BLADDER: Multiple renal cysts are stable in need no workup. No stone or hydronephrosis.    BOWEL: Mildly dilated fluid-filled proximal small bowel secondary to a hernia within low right lower quadrant, this appears to be a low spigelian hernia containing a short loop of dilated small bowel (coronal images 30-35; axial images 145-150; sagittal   images 32-35).  Distal small bowel is decompressed. Colon normal in caliber. Moderate diverticulosis.    LYMPH NODES: Normal.    VASCULATURE: Unremarkable.    PELVIC ORGANS: Hysterectomy. No adnexal lesions. No free fluid.    MUSCULOSKELETAL: No suspicious bony lesion. Right-sided InterStim device.      Impression    IMPRESSION:   1.  Mechanical mid small bowel obstruction related to what appears to be a low spigelian hernia within right lower quadrant.

## 2022-08-20 NOTE — PHARMACY-ANTICOAGULATION SERVICE
Clinical Pharmacy - Warfarin Dosing Consult     Pharmacy has been consulted to manage this patient s warfarin therapy.  Indication: Atrial Fibrillation  Therapy Goal: INR 2-3  Warfarin Prior to Admission: Yes  Warfarin PTA Regimen: 7.5 MWF, 5ROW    INR   Date Value Ref Range Status   08/20/2022 2.83 (H) 0.85 - 1.15 Final   08/19/2022 2.90 (H) 0.85 - 1.15 Final       Recommend warfarin 5 mg today.  Pharmacy will monitor Paty Grajeda daily and order warfarin doses to achieve specified goal.      Please contact pharmacy as soon as possible if the warfarin needs to be held for a procedure or if the warfarin goals change.

## 2022-08-20 NOTE — PHARMACY-ADMISSION MEDICATION HISTORY
Admission medication history interview status for this patient is complete. See [facility] admission navigator for allergy information, prior to admission medications and immunization status.     Medication history interview done, indicate source(s): Patient  Medication history resources (including written lists, pill bottles, clinic record): [facility] and [place]  Pharmacy: [facility] [place]    Changes made to PTA medication list:  Added: Losartan, Amlodipine, Ferrous Sulfate  Changed: Warfarin dose  Reported as Not Taking:   Removed: Lisinopril    Actions taken by pharmacist (provider contacted, etc):None     Additional medication history information:None    Medication reconciliation/reorder completed by provider prior to medication history?  No       Prior to Admission medications    Medication Sig Last Dose Taking? Auth Provider Long Term End Date   acyclovir (ZOVIRAX) 400 MG tablet Take 400 mg by mouth 2 times daily 8/19/2022 at x1 Yes Unknown, Entered By History No    amLODIPine (NORVASC) 5 MG tablet Take 5 mg by mouth every evening 8/18/2022 at pm Yes Unknown, Entered By History     atorvastatin (LIPITOR) 40 MG tablet Take 0.5 tablets (20 mg) by mouth daily 8/18/2022 at pm Yes Montana Orozco MD, MD Yes    Calcium Carb-Cholecalciferol (CALCIUM CARBONATE-VITAMIN D3) 600-400 MG-UNIT TABS Take 1 tablet by mouth 2 times daily  8/19/2022 at x1 Yes Unknown, Entered By History     levothyroxine (SYNTHROID/LEVOTHROID) 75 MCG tablet Take 75 mcg by mouth daily 8/19/2022 at Unknown time Yes Unknown, Entered By History No    losartan (COZAAR) 100 MG tablet Take 100 mg by mouth daily 8/19/2022 at Unknown time Yes Unknown, Entered By History Yes    metoprolol succinate ER (TOPROL-XL) 25 MG 24 hr tablet Take 25 mg by mouth 2 times daily  8/19/2022 at x1 Yes Unknown, Entered By History Yes    prednisoLONE acetate (PRED FORTE) 1 % ophthalmic suspension Place 1 drop Into the left eye every evening 8/18/2022 at Unknown time Yes  Unknown, Entered By History     Vitamin D, Cholecalciferol, 50 MCG (2000 UT) CAPS Take 1 capsule by mouth daily  8/19/2022 at Unknown time Yes Unknown, Entered By History     warfarin ANTICOAGULANT (COUMADIN) 5 MG tablet Take 1 tablet (5 mg) by mouth daily  Patient taking differently: Take by mouth daily Take 1.5 tablets (7.5 mg) MWF and 1 tablet (5 mg) all other days or as directed by INR clinic. 8/18/2022 at 5 mg Yes Paty Duque MD     acetaminophen (TYLENOL) 500 MG tablet Take 1,000 mg by mouth nightly as needed for mild pain    Unknown, Entered By History     clobetasol (TEMOVATE) 0.05 % external ointment Apply topically once a week  8/9/2022  Unknown, Entered By History     fluticasone (FLONASE) 50 MCG/ACT nasal spray Spray 1 spray into both nostrils nightly as needed    Unknown, Entered By History

## 2022-08-20 NOTE — PLAN OF CARE
Goal Outcome Evaluation:    Pertinent assessments: Patient brought from ED around 11pm last night. A&Ox4. Denies pain or difficulty breathing. BP slightly elevated at 136/72, but IV Metoprolol held d/t HR 58. Rest of vitals stable on room air.   Patient reported some nausea which was improved after IV Zofran. Also, reported acid reflux, which slightly improved after IV Pepcid. Otherwise, abdomen soft, and bowel sounds faint. Patient not passing gas. Last bowel movement yesterday at home. Voiding. Independent.

## 2022-08-20 NOTE — PROGRESS NOTES
[facility]    Hospitalist Progress Note  Name: Paty Grajeda    MRN: [id number]  Provider:  Yonas Sorenson DO  Date of Service: 08/20/2022    Summary of Stay: Paty Grajeda is a 77 year old female with a history of GERD, hypertension, dyslipidemia, paroxysmal atrial fibrillation on Coumadin admitted on 8/19/2022 with severe sudden onset abdominal pain.  In the emergency department, the patient was found to have a temperature of 98.0  F, blood pressure 150/80, heart rate 66, respiratory rate 16, SPO2 94% on room air.  Initial lab work showed platelet 137, INR 2.90, creatinine 0.99, hepatic panel within normal limits.  CT abdomen and pelvis showed mechanical mid small bowel obstruction related to what appears to be a low spigelian hernia within the right lower quadrant.  The patient had this reduced in the emergency department successfully.  Repeat CT abdomen and pelvis showed reduced hernia.  The patient was made n.p.o. and her Coumadin was reversed in anticipation of possible need for surgery.  General surgery was consulted to see the patient.  The patient's symptoms did resolve the following day and the patient's diet was advanced.    TODAY'S PLAN: Resume PTA medications including warfarin.  Discussed with general surgery.  Advance diet as tolerated.  Anticipate discharge home this afternoon if tolerating clear liquid diet.  Plan is for follow-up with general surgery later this week for discussion regarding hernia surgery.    Problem List:   Suspected Incarcerated Hernia s/p Manual Reduction on 8/20/2022  - Appreciate General Surgery recommendations  - Advance diet as tolerated  - IVF  - Pain control  - Plan for outpatient follow up with General Surgery this week    Paroxysmal Atrial Fibrillation on Warfarin  - Warfarin held initially with concern for possible need for OR.  No surgery planned so will resume Warfarin  - Pharmacy consult for dosing     Hypertension  - Resume Losartan and  Amlodipine and metoprolol    Chronic Medical Problems:  GERD  Dyslipidemia  Hypothyroidism    DVT Prophylaxis: Warfarin  Code Status: Full Code  Diet: Advance Diet as Tolerated: Clear Liquid Diet    Schumacher Catheter: Not present  Disposition: Expected discharge today to home. Goals prior to discharge include tolerating oral diet.   Family updated today: Yes      Interval History   Pt seen and examined.  Pt denies any abd pain.  Has been passing gas.    -Data reviewed today: I personally reviewed all new labs and imaging results over the last 24 hours.     Physical Exam   Temp: 98  F (36.7  C) Temp src: Oral BP: 128/67 Pulse: 61   Resp: 18 SpO2: 94 % O2 Device: None (Room air)    Vitals:    08/19/22 2317   Weight: 77.9 kg (171 lb 11.2 oz)     Vital Signs with Ranges  Temp:  [97.8  F (36.6  C)-98  F (36.7  C)] 98  F (36.7  C)  Pulse:  [57-70] 61  Resp:  [16-20] 18  BP: (128-167)/(39-90) 128/67  SpO2:  [94 %-100 %] 94 %  No intake/output data recorded.    GENERAL: No apparent distress. Awake, alert, and fully oriented.  HEENT: Normocephalic, atraumatic. Extraocular movements intact.  CARDIOVASCULAR: Regular rate and rhythm without murmurs or rubs. No S3.  PULMONARY: Clear bilaterally.  GASTROINTESTINAL: Soft, non-tender, non-distended. Bowel sounds normoactive.   EXTREMITIES: No cyanosis or clubbing. No edema.  NEUROLOGICAL: CN 2-12 grossly intact, no focal neurological deficits.  DERMATOLOGICAL: No rash, ulcer, bruising, nor jaundice.    Medications       amLODIPine  5 mg Oral QPM     atorvastatin  20 mg Oral Daily     famotidine  20 mg Intravenous Q24H     levothyroxine  75 mcg Oral Daily     losartan  100 mg Oral Daily     metoprolol succinate ER  25 mg Oral BID     prednisoLONE acetate  1 drop Left Eye QPM     sodium chloride (PF)  3 mL Intracatheter Q8H     Warfarin Therapy Reminder  1 each Oral See Admin Instructions     Data     Laboratory:  Recent Labs   Lab 08/20/22  0605 08/19/22  1545   WBC 5.8 5.8   HGB 13.2  13.6   HCT 41.8 41.6    97   * 137*     Recent Labs   Lab 08/20/22  0605 08/19/22  1545    141   POTASSIUM 4.9 3.8   CHLORIDE 111* 105   CO2 28 26   ANIONGAP 4* 10   * 125*   BUN 13.2 22.2   CR 0.83 0.99*   GFRESTIMATED 72 58*   JULIAN 9.0 9.6     No results for input(s): CULT in the last 168 hours.    Imaging:  Recent Results (from the past 24 hour(s))   CT Abdomen Pelvis w Contrast    Narrative    EXAM: CT ABDOMEN PELVIS W CONTRAST  LOCATION: [facility]  DATE/TIME: 8/19/2022 5:50 PM    INDICATION: RLQ pain  COMPARISON: 2/3/2020  TECHNIQUE: CT scan of the abdomen and pelvis was performed following injection of IV contrast. Multiplanar reformats were obtained. Dose reduction techniques were used.  CONTRAST: 68 mL Isovue 370    FINDINGS:   LOWER CHEST: Motion artifact. Mild dependent atelectasis.    HEPATOBILIARY: 2 separate benign hemangiomata inferior right lobe of liver with a few tiny hepatic cysts. Biliary system unremarkable.    PANCREAS: Normal.    SPLEEN: Normal.    ADRENAL GLANDS: Stable 2.2 cm presumed adenoma within left adrenal gland. Stable slight calcifications within right adrenal gland.    KIDNEYS/BLADDER: Multiple renal cysts are stable in need no workup. No stone or hydronephrosis.    BOWEL: Mildly dilated fluid-filled proximal small bowel secondary to a hernia within low right lower quadrant, this appears to be a low spigelian hernia containing a short loop of dilated small bowel (coronal images 30-35; axial images 145-150; sagittal   images 32-35).  Distal small bowel is decompressed. Colon normal in caliber. Moderate diverticulosis.    LYMPH NODES: Normal.    VASCULATURE: Unremarkable.    PELVIC ORGANS: Hysterectomy. No adnexal lesions. No free fluid.    MUSCULOSKELETAL: No suspicious bony lesion. Right-sided InterStim device.      Impression    IMPRESSION:   1.  Mechanical mid small bowel obstruction related to what appears to be a low spigelian  hernia within right lower quadrant.   Abd/pelvis CT no contrast - Stone Protocol    Narrative    EXAM: CT ABDOMEN PELVIS W/O CONTRAST  LOCATION: [facility]  DATE/TIME: 8/19/2022 8:37 PM    INDICATION: Rescan after attempt at abdominal wall hernia reduction to assess.  COMPARISON: 08/19/2022 at 5:41 PM.  TECHNIQUE: CT scan of the abdomen and pelvis was performed without IV contrast. Multiplanar reformats were obtained. Dose reduction techniques were used.  CONTRAST: None.    FINDINGS: Right lower quadrant hernia has been reduced. Small bowel distention has resolved. No other change from comparison.      Impression    IMPRESSION: Reduced right lower quadrant abdominal wall hernia.           Yonas Sorenson DO  [facility]  [address] [place] [address]  08/20/2022

## 2022-08-21 ENCOUNTER — PREP FOR PROCEDURE (OUTPATIENT)
Dept: SURGERY | Facility: CLINIC | Age: 77
End: 2022-08-21

## 2022-08-21 NOTE — DISCHARGE SUMMARY
Hospitalist Discharge Summary  [facility]    Paty Grajeda MRN# [id number]   YOB: 1945 Age: 77 year old     Date of Admission:  8/19/2022  Date of Discharge:  8/20/2022  1:24 PM  Admitting Physician:  Davy Lopez DO  Discharge Physician:  Yonas Sorenson DO  Discharging Service:  Hospitalist     Primary Provider: [facility], [place]          Discharge Diagnosis:     Suspected Incarcerated Hernia s/p Manual Reduction on 8/20/2022  - Appreciate General Surgery recommendations  - Advance diet as tolerated  - IVF  - Pain control  - Plan for outpatient follow up with General Surgery this week     Paroxysmal Atrial Fibrillation on Warfarin  - Warfarin held initially with concern for possible need for OR.  No surgery planned so will resume Warfarin  - Pharmacy consult for dosing      Hypertension  - Resume Losartan and Amlodipine and metoprolol     Chronic Medical Problems:  GERD  Dyslipidemia  Hypothyroidism             Discharge Disposition:     Discharged to home           Allergies:     No Known Allergies           Discharge Medications:     Discharge Medication List as of 8/20/2022  1:06 PM      CONTINUE these medications which have NOT CHANGED    Details   acyclovir (ZOVIRAX) 400 MG tablet Take 400 mg by mouth 2 times daily, Historical      amLODIPine (NORVASC) 5 MG tablet Take 5 mg by mouth every evening, Historical      atorvastatin (LIPITOR) 40 MG tablet Take 0.5 tablets (20 mg) by mouth daily, Disp-30 tablet, R-0, E-Prescribe      Calcium Carb-Cholecalciferol (CALCIUM CARBONATE-VITAMIN D3) 600-400 MG-UNIT TABS Take 1 tablet by mouth 2 times daily , Historical      Ferrous Sulfate 324 (65 Fe) MG TBEC Take 324 mg by mouth daily, Historical      levothyroxine (SYNTHROID/LEVOTHROID) 75 MCG tablet Take 75 mcg by mouth daily, Historical      losartan (COZAAR) 100 MG tablet Take 100 mg by mouth daily, Historical      metoprolol succinate ER (TOPROL-XL) 25 MG 24 hr tablet  "Take 25 mg by mouth 2 times daily , Historical      prednisoLONE acetate (PRED FORTE) 1 % ophthalmic suspension Place 1 drop Into the left eye every evening, Historical      Vitamin D, Cholecalciferol, 50 MCG (2000 UT) CAPS Take 1 capsule by mouth daily , Historical      warfarin ANTICOAGULANT (COUMADIN) 5 MG tablet Take 7.5mg by mouth daily on Monday, Wednesday and Friday.  Take 5mg all other days or as directed by INR clinic, Historical      acetaminophen (TYLENOL) 500 MG tablet Take 1,000 mg by mouth nightly as needed for mild pain , Historical      clobetasol (TEMOVATE) 0.05 % external ointment Apply topically once a week Historical      fluticasone (FLONASE) 50 MCG/ACT nasal spray Spray 1 spray into both nostrils nightly as needed , Historical                    Condition on Discharge:     Discharge condition: Fair   Discharge vitals: Blood pressure 128/67, pulse 61, temperature 98  F (36.7  C), temperature source Oral, resp. rate 18, height 1.575 m (5' 2\"), weight 77.9 kg (171 lb 11.2 oz), SpO2 94 %.   Code status on discharge: Full Code      BASIC PHYSICAL EXAMINATION:  GENERAL: No apparent distress.  CARDIOVASCULAR: Regular rate and rhythm without murmurs.  PULMONARY: Clear to auscultation bilaterally.   GASTROINTESTINAL: Abdomen soft, non-tender.  EXTREMITIES: No edema, pulses intact.  NEUROLOGIC: No focal deficits.            History of Illness:   See detailed admission note for full details.               Procedures excluding imaging which is summarized below:     Please see details in the electronic medical record.           Consultations:     SURGERY GENERAL IP CONSULT  PHARMACY TO DOSE WARFARIN          Significant Results:     Results for orders placed or performed during the hospital encounter of 08/19/22   CT Abdomen Pelvis w Contrast    Narrative    EXAM: CT ABDOMEN PELVIS W CONTRAST  LOCATION: [facility]  DATE/TIME: 8/19/2022 5:50 PM    INDICATION: RLQ pain  COMPARISON: " 2/3/2020  TECHNIQUE: CT scan of the abdomen and pelvis was performed following injection of IV contrast. Multiplanar reformats were obtained. Dose reduction techniques were used.  CONTRAST: 68 mL Isovue 370    FINDINGS:   LOWER CHEST: Motion artifact. Mild dependent atelectasis.    HEPATOBILIARY: 2 separate benign hemangiomata inferior right lobe of liver with a few tiny hepatic cysts. Biliary system unremarkable.    PANCREAS: Normal.    SPLEEN: Normal.    ADRENAL GLANDS: Stable 2.2 cm presumed adenoma within left adrenal gland. Stable slight calcifications within right adrenal gland.    KIDNEYS/BLADDER: Multiple renal cysts are stable in need no workup. No stone or hydronephrosis.    BOWEL: Mildly dilated fluid-filled proximal small bowel secondary to a hernia within low right lower quadrant, this appears to be a low spigelian hernia containing a short loop of dilated small bowel (coronal images 30-35; axial images 145-150; sagittal   images 32-35).  Distal small bowel is decompressed. Colon normal in caliber. Moderate diverticulosis.    LYMPH NODES: Normal.    VASCULATURE: Unremarkable.    PELVIC ORGANS: Hysterectomy. No adnexal lesions. No free fluid.    MUSCULOSKELETAL: No suspicious bony lesion. Right-sided InterStim device.      Impression    IMPRESSION:   1.  Mechanical mid small bowel obstruction related to what appears to be a low spigelian hernia within right lower quadrant.   Abd/pelvis CT no contrast - Stone Protocol    Narrative    EXAM: CT ABDOMEN PELVIS W/O CONTRAST  LOCATION: [facility]  DATE/TIME: 8/19/2022 8:37 PM    INDICATION: Rescan after attempt at abdominal wall hernia reduction to assess.  COMPARISON: 08/19/2022 at 5:41 PM.  TECHNIQUE: CT scan of the abdomen and pelvis was performed without IV contrast. Multiplanar reformats were obtained. Dose reduction techniques were used.  CONTRAST: None.    FINDINGS: Right lower quadrant hernia has been reduced. Small bowel  distention has resolved. No other change from comparison.      Impression    IMPRESSION: Reduced right lower quadrant abdominal wall hernia.           Transthoracic Echocardiogram Results:  No results found for this or any previous visit (from the past 4320 hour(s)).             Pending Results:     Unresulted Labs Ordered in the Past 30 Days of this Admission     No orders found from 7/20/2022 to 8/20/2022.                      Discharge Instructions and Follow-Up:     Discharge instructions and follow-up:   Discharge Procedure Orders   Reason for your hospital stay   Order Comments: Suspected Incarcerated Hernia     Activity   Order Comments: Your activity upon discharge: activity as tolerated     Order Specific Question Answer Comments   Is discharge order? Yes      Follow-up and recommended labs and tests    Order Comments: Follow up with primary care provider, [place] [facility], within 7 days for hospital follow- up.  The following labs/tests are recommended: CBC, BMP.  You should get your INR checked in 3-4 days.    Follow up with General Surgery this week.  They will call you to make an appointment.     Diet   Order Comments: Follow this diet upon discharge: Regular     Order Specific Question Answer Comments   Is discharge order? Yes              Hospital Course:     Paty Grajeda is a 77 year old female with a history of GERD, hypertension, dyslipidemia, paroxysmal atrial fibrillation on Coumadin admitted on 8/19/2022 with severe sudden onset abdominal pain.  In the emergency department, the patient was found to have a temperature of 98.0  F, blood pressure 150/80, heart rate 66, respiratory rate 16, SPO2 94% on room air.  Initial lab work showed platelet 137, INR 2.90, creatinine 0.99, hepatic panel within normal limits.  CT abdomen and pelvis showed mechanical mid small bowel obstruction related to what appears to be a low spigelian hernia within the right lower quadrant.  The patient had this  reduced in the emergency department successfully.  Repeat CT abdomen and pelvis showed reduced hernia.  The patient was made n.p.o. and her Coumadin was reversed in anticipation of possible need for surgery.  General surgery was consulted to see the patient.  The patient's symptoms did resolve the following day and the patient's diet was advanced.  On 8/20/2022, the patient was discharged home to follow-up as an outpatient.    The patient was seen, examined, and counseled on this day. The hospitalization and plan of care was reviewed with the patient extensively. All questions were addressed and the patient agreed to follow-up as noted above.      Total time spent in face to face contact with the patient and coordinating discharge was:  34 Minutes    Yonas Sorenson DO  [facility] Hospitalist  [address] [place] [address]  08/21/2022

## 2022-08-22 ENCOUNTER — TELEPHONE (OUTPATIENT)
Dept: SURGERY | Facility: CLINIC | Age: 77
End: 2022-08-22

## 2022-08-22 NOTE — LETTER
[facility]    [address], [address]  [place], [place] [address]  Phone [phone]  Fax [phone]    [address], [address]  [place] [address]  Phone [phone]  Fax [phone]    [email or web address]   2022       Paty Grajeda    RE: [id number]  : 1945    Paty Grajeda has been scheduled for surgery on 22 at 7:50 AM at [facility] with Dr. Hester.  The hospital is located at [address] in [place].      Please check in at the Surgery reception desk at 5:50 am. This is located in the back of the [facility] on the East side, just past the Emergency Room entrance.       DO NOT EAT OR DRINK ANYTHING AFTER MIDNIGHT THE NIGHT BEFORE YOUR  SURGERY.   You may have sips of clear liquids up until 2 hours before your surgery.   If you have been advised to take your medication, please do this early in the morning with just sips of clear liquid.          If you are on a blood thinner such as COUMADIN, please stop taking this 5 days before your surgery.           Hospital regulations require an updated pre-operative examination to be completed within 30 days of the procedure. This can be done by your primary care provider. Please ask them to fax documentation to [phone]. We also recommend you bring a copy with you.       During the current pandemic, a COVID-19 at home rapid antigen test is required   1-2 days before your procedure per hospital regulations. You can buy these at many pharmacy stores. Or you can order free, at-home tests at covid.gov/tests  ? If your test is negative, please take a photo of your test. Show the photo to the nurse when you come in for your procedure.  ? If your test is positive, please call our office at [phone].      You should shower before your surgery with Hibiclens or Exidine soap.  This can be found at your local pharmacy or you can  pick it up from our office for free.  Please call our office if you have any questions.       You will be required to have an Adult (friend or family member) drive you home after your surgery and arrange for an adult to stay with you until the next morning.       You will receive several calls from our staff 3-7 days prior to your scheduled procedure with further details and to answer any questions you may have.      It is sometimes necessary to adjust the surgery schedule due to emergencies and additions to the schedule.  If your surgery is affected by this, we greatly appreciate your flexibility and understanding in this matter      It is best if you call regarding post-operative questions between the hours of 8:00 am & 3:00 pm Monday-Friday, so you have access to the daytime care team that know you best.  ? Prescription refills are accepted during regular office hours only.      Please do not bring and Disability or FMLA papers to the hospital.  They need to be either faxed ([phone]), mailed or hand delivered to our office by you or a family member for completion.  ? Please allow 14 business days to complete paperwork.        If you have questions or concerns, please contact our office at [phone].

## 2022-08-22 NOTE — TELEPHONE ENCOUNTER
Type of surgery: ROBOTIC ASSISTED VENTRAL HERNIA REPAIR   Location of surgery: [facility]  Date and time of surgery: 9-13-22, 7:50 AM   Surgeon: DR. GALVIN   Pre-Op Appt Date: PATIENT TO SCHEDULE   Post-Op Appt Date: NA    Packet sent out: Yes  Pre-cert/Authorization completed:  Not Applicable  Date: 8-22-22         ROBOTIC ASSISTED VENTRAL HERNIA REPAIR   GENERAL   PT INST TO HAVE H&P   2.5 HRS REQ  PA ASSIST RTC   ALW

## 2022-09-13 ENCOUNTER — ANESTHESIA EVENT (OUTPATIENT)
Dept: SURGERY | Facility: CLINIC | Age: 77
End: 2022-09-13
Payer: COMMERCIAL

## 2022-09-13 ENCOUNTER — ANESTHESIA (OUTPATIENT)
Dept: SURGERY | Facility: CLINIC | Age: 77
End: 2022-09-13
Payer: COMMERCIAL

## 2022-09-13 ENCOUNTER — HOSPITAL ENCOUNTER (OUTPATIENT)
Facility: CLINIC | Age: 77
Discharge: HOME | End: 2022-09-13
Attending: STUDENT IN AN ORGANIZED HEALTH CARE EDUCATION/TRAINING PROGRAM | Admitting: STUDENT IN AN ORGANIZED HEALTH CARE EDUCATION/TRAINING PROGRAM
Payer: COMMERCIAL

## 2022-09-13 VITALS
DIASTOLIC BLOOD PRESSURE: 82 MMHG | SYSTOLIC BLOOD PRESSURE: 149 MMHG | BODY MASS INDEX: 31.21 KG/M2 | WEIGHT: 169.6 LBS | RESPIRATION RATE: 12 BRPM | HEIGHT: 62 IN | HEART RATE: 58 BPM | TEMPERATURE: 96.8 F | OXYGEN SATURATION: 99 %

## 2022-09-13 DIAGNOSIS — K46.0 INCARCERATED HERNIA: ICD-10-CM

## 2022-09-13 DIAGNOSIS — K43.9 SPIGELIAN HERNIA: Primary | ICD-10-CM

## 2022-09-13 LAB — INR PPP: 1.05 (ref 0.85–1.15)

## 2022-09-13 PROCEDURE — 250N000013 HC RX MED GY IP 250 OP 250 PS 637: Performed by: ANESTHESIOLOGY

## 2022-09-13 PROCEDURE — 999N000141 HC STATISTIC PRE-PROCEDURE NURSING ASSESSMENT: Performed by: STUDENT IN AN ORGANIZED HEALTH CARE EDUCATION/TRAINING PROGRAM

## 2022-09-13 PROCEDURE — 250N000011 HC RX IP 250 OP 636: Performed by: NURSE ANESTHETIST, CERTIFIED REGISTERED

## 2022-09-13 PROCEDURE — 250N000011 HC RX IP 250 OP 636: Performed by: ANESTHESIOLOGY

## 2022-09-13 PROCEDURE — 250N000011 HC RX IP 250 OP 636: Performed by: SURGERY

## 2022-09-13 PROCEDURE — 49653 PR LAP VENT/ABD HERN PROC COMP: CPT | Performed by: STUDENT IN AN ORGANIZED HEALTH CARE EDUCATION/TRAINING PROGRAM

## 2022-09-13 PROCEDURE — 272N000001 HC OR GENERAL SUPPLY STERILE: Performed by: STUDENT IN AN ORGANIZED HEALTH CARE EDUCATION/TRAINING PROGRAM

## 2022-09-13 PROCEDURE — 250N000009 HC RX 250: Performed by: NURSE ANESTHETIST, CERTIFIED REGISTERED

## 2022-09-13 PROCEDURE — 85610 PROTHROMBIN TIME: CPT | Performed by: ANESTHESIOLOGY

## 2022-09-13 PROCEDURE — 360N000080 HC SURGERY LEVEL 7, PER MIN: Performed by: STUDENT IN AN ORGANIZED HEALTH CARE EDUCATION/TRAINING PROGRAM

## 2022-09-13 PROCEDURE — 710N000012 HC RECOVERY PHASE 2, PER MINUTE: Performed by: STUDENT IN AN ORGANIZED HEALTH CARE EDUCATION/TRAINING PROGRAM

## 2022-09-13 PROCEDURE — 258N000003 HC RX IP 258 OP 636: Performed by: ANESTHESIOLOGY

## 2022-09-13 PROCEDURE — 36415 COLL VENOUS BLD VENIPUNCTURE: CPT | Performed by: ANESTHESIOLOGY

## 2022-09-13 PROCEDURE — 250N000009 HC RX 250: Performed by: STUDENT IN AN ORGANIZED HEALTH CARE EDUCATION/TRAINING PROGRAM

## 2022-09-13 PROCEDURE — C1781 MESH (IMPLANTABLE): HCPCS | Performed by: STUDENT IN AN ORGANIZED HEALTH CARE EDUCATION/TRAINING PROGRAM

## 2022-09-13 PROCEDURE — 710N000009 HC RECOVERY PHASE 1, LEVEL 1, PER MIN: Performed by: STUDENT IN AN ORGANIZED HEALTH CARE EDUCATION/TRAINING PROGRAM

## 2022-09-13 PROCEDURE — 258N000003 HC RX IP 258 OP 636: Performed by: NURSE ANESTHETIST, CERTIFIED REGISTERED

## 2022-09-13 PROCEDURE — 250N000013 HC RX MED GY IP 250 OP 250 PS 637: Performed by: STUDENT IN AN ORGANIZED HEALTH CARE EDUCATION/TRAINING PROGRAM

## 2022-09-13 PROCEDURE — 370N000017 HC ANESTHESIA TECHNICAL FEE, PER MIN: Performed by: STUDENT IN AN ORGANIZED HEALTH CARE EDUCATION/TRAINING PROGRAM

## 2022-09-13 DEVICE — MESH COMPOSITE PARIETENE DS 15X10X1CM PPDS1510: Type: IMPLANTABLE DEVICE | Site: ABDOMEN | Status: FUNCTIONAL

## 2022-09-13 RX ORDER — FENTANYL CITRATE 50 UG/ML
INJECTION, SOLUTION INTRAMUSCULAR; INTRAVENOUS PRN
Status: DISCONTINUED | OUTPATIENT
Start: 2022-09-13 | End: 2022-09-13

## 2022-09-13 RX ORDER — KETOROLAC TROMETHAMINE 30 MG/ML
INJECTION, SOLUTION INTRAMUSCULAR; INTRAVENOUS PRN
Status: DISCONTINUED | OUTPATIENT
Start: 2022-09-13 | End: 2022-09-13

## 2022-09-13 RX ORDER — PROPOFOL 10 MG/ML
INJECTION, EMULSION INTRAVENOUS PRN
Status: DISCONTINUED | OUTPATIENT
Start: 2022-09-13 | End: 2022-09-13

## 2022-09-13 RX ORDER — AMOXICILLIN 250 MG
1-2 CAPSULE ORAL 2 TIMES DAILY
Qty: 30 TABLET | Refills: 0 | Status: SHIPPED | OUTPATIENT
Start: 2022-09-13 | End: 2024-05-06

## 2022-09-13 RX ORDER — HEPARIN SODIUM 5000 [USP'U]/.5ML
5000 INJECTION, SOLUTION INTRAVENOUS; SUBCUTANEOUS
Status: COMPLETED | OUTPATIENT
Start: 2022-09-13 | End: 2022-09-13

## 2022-09-13 RX ORDER — ACETAMINOPHEN 325 MG/1
650 TABLET ORAL
Status: DISCONTINUED | OUTPATIENT
Start: 2022-09-13 | End: 2022-09-13 | Stop reason: HOSPADM

## 2022-09-13 RX ORDER — LIDOCAINE HYDROCHLORIDE 10 MG/ML
INJECTION, SOLUTION EPIDURAL; INFILTRATION; INTRACAUDAL; PERINEURAL PRN
Status: DISCONTINUED | OUTPATIENT
Start: 2022-09-13 | End: 2022-09-13

## 2022-09-13 RX ORDER — FENTANYL CITRATE 50 UG/ML
25 INJECTION, SOLUTION INTRAMUSCULAR; INTRAVENOUS EVERY 5 MIN PRN
Status: DISCONTINUED | OUTPATIENT
Start: 2022-09-13 | End: 2022-09-13 | Stop reason: HOSPADM

## 2022-09-13 RX ORDER — OXYCODONE HYDROCHLORIDE 5 MG/1
5 TABLET ORAL
Status: DISCONTINUED | OUTPATIENT
Start: 2022-09-13 | End: 2022-09-13 | Stop reason: HOSPADM

## 2022-09-13 RX ORDER — CEFAZOLIN SODIUM/WATER 2 G/20 ML
2 SYRINGE (ML) INTRAVENOUS
Status: COMPLETED | OUTPATIENT
Start: 2022-09-13 | End: 2022-09-13

## 2022-09-13 RX ORDER — BUPIVACAINE HYDROCHLORIDE AND EPINEPHRINE 2.5; 5 MG/ML; UG/ML
INJECTION, SOLUTION EPIDURAL; INFILTRATION; INTRACAUDAL; PERINEURAL PRN
Status: DISCONTINUED | OUTPATIENT
Start: 2022-09-13 | End: 2022-09-13 | Stop reason: HOSPADM

## 2022-09-13 RX ORDER — PROPOFOL 10 MG/ML
INJECTION, EMULSION INTRAVENOUS CONTINUOUS PRN
Status: DISCONTINUED | OUTPATIENT
Start: 2022-09-13 | End: 2022-09-13

## 2022-09-13 RX ORDER — FENTANYL CITRATE 50 UG/ML
25 INJECTION, SOLUTION INTRAMUSCULAR; INTRAVENOUS
Status: DISCONTINUED | OUTPATIENT
Start: 2022-09-13 | End: 2022-09-13 | Stop reason: HOSPADM

## 2022-09-13 RX ORDER — MEPERIDINE HYDROCHLORIDE 25 MG/ML
12.5 INJECTION INTRAMUSCULAR; INTRAVENOUS; SUBCUTANEOUS
Status: DISCONTINUED | OUTPATIENT
Start: 2022-09-13 | End: 2022-09-13 | Stop reason: HOSPADM

## 2022-09-13 RX ORDER — ACETAMINOPHEN 325 MG/1
650 TABLET ORAL EVERY 4 HOURS PRN
Qty: 50 TABLET | Refills: 0 | Status: SHIPPED | OUTPATIENT
Start: 2022-09-13 | End: 2024-05-06

## 2022-09-13 RX ORDER — NEOSTIGMINE METHYLSULFATE 1 MG/ML
VIAL (ML) INJECTION PRN
Status: DISCONTINUED | OUTPATIENT
Start: 2022-09-13 | End: 2022-09-13

## 2022-09-13 RX ORDER — ONDANSETRON 8 MG/1
4-8 TABLET, FILM COATED ORAL EVERY 8 HOURS PRN
Qty: 20 TABLET | Refills: 0 | Status: SHIPPED | OUTPATIENT
Start: 2022-09-13 | End: 2024-05-06

## 2022-09-13 RX ORDER — NALOXONE HYDROCHLORIDE 0.4 MG/ML
0.4 INJECTION, SOLUTION INTRAMUSCULAR; INTRAVENOUS; SUBCUTANEOUS
Status: DISCONTINUED | OUTPATIENT
Start: 2022-09-13 | End: 2022-09-13 | Stop reason: HOSPADM

## 2022-09-13 RX ORDER — DEXAMETHASONE SODIUM PHOSPHATE 4 MG/ML
INJECTION, SOLUTION INTRA-ARTICULAR; INTRALESIONAL; INTRAMUSCULAR; INTRAVENOUS; SOFT TISSUE PRN
Status: DISCONTINUED | OUTPATIENT
Start: 2022-09-13 | End: 2022-09-13

## 2022-09-13 RX ORDER — CEFAZOLIN SODIUM/WATER 2 G/20 ML
2 SYRINGE (ML) INTRAVENOUS SEE ADMIN INSTRUCTIONS
Status: DISCONTINUED | OUTPATIENT
Start: 2022-09-13 | End: 2022-09-13 | Stop reason: HOSPADM

## 2022-09-13 RX ORDER — NALOXONE HYDROCHLORIDE 0.4 MG/ML
0.2 INJECTION, SOLUTION INTRAMUSCULAR; INTRAVENOUS; SUBCUTANEOUS
Status: DISCONTINUED | OUTPATIENT
Start: 2022-09-13 | End: 2022-09-13 | Stop reason: HOSPADM

## 2022-09-13 RX ORDER — ONDANSETRON 4 MG/1
4 TABLET, ORALLY DISINTEGRATING ORAL EVERY 30 MIN PRN
Status: DISCONTINUED | OUTPATIENT
Start: 2022-09-13 | End: 2022-09-13 | Stop reason: HOSPADM

## 2022-09-13 RX ORDER — OXYCODONE HYDROCHLORIDE 5 MG/1
5 TABLET ORAL EVERY 4 HOURS PRN
Status: DISCONTINUED | OUTPATIENT
Start: 2022-09-13 | End: 2022-09-13 | Stop reason: HOSPADM

## 2022-09-13 RX ORDER — ONDANSETRON 2 MG/ML
4 INJECTION INTRAMUSCULAR; INTRAVENOUS EVERY 30 MIN PRN
Status: DISCONTINUED | OUTPATIENT
Start: 2022-09-13 | End: 2022-09-13 | Stop reason: HOSPADM

## 2022-09-13 RX ORDER — GLYCOPYRROLATE 0.2 MG/ML
INJECTION, SOLUTION INTRAMUSCULAR; INTRAVENOUS PRN
Status: DISCONTINUED | OUTPATIENT
Start: 2022-09-13 | End: 2022-09-13

## 2022-09-13 RX ORDER — SODIUM CHLORIDE, SODIUM LACTATE, POTASSIUM CHLORIDE, CALCIUM CHLORIDE 600; 310; 30; 20 MG/100ML; MG/100ML; MG/100ML; MG/100ML
INJECTION, SOLUTION INTRAVENOUS CONTINUOUS
Status: DISCONTINUED | OUTPATIENT
Start: 2022-09-13 | End: 2022-09-13 | Stop reason: HOSPADM

## 2022-09-13 RX ORDER — LIDOCAINE 40 MG/G
CREAM TOPICAL
Status: DISCONTINUED | OUTPATIENT
Start: 2022-09-13 | End: 2022-09-13 | Stop reason: HOSPADM

## 2022-09-13 RX ORDER — ONDANSETRON 2 MG/ML
INJECTION INTRAMUSCULAR; INTRAVENOUS PRN
Status: DISCONTINUED | OUTPATIENT
Start: 2022-09-13 | End: 2022-09-13

## 2022-09-13 RX ORDER — OXYCODONE HYDROCHLORIDE 5 MG/1
5-10 TABLET ORAL EVERY 4 HOURS PRN
Qty: 10 TABLET | Refills: 0 | Status: SHIPPED | OUTPATIENT
Start: 2022-09-13 | End: 2024-05-06

## 2022-09-13 RX ADMIN — SODIUM CHLORIDE, POTASSIUM CHLORIDE, SODIUM LACTATE AND CALCIUM CHLORIDE: 600; 310; 30; 20 INJECTION, SOLUTION INTRAVENOUS at 06:53

## 2022-09-13 RX ADMIN — ROCURONIUM BROMIDE 10 MG: 50 INJECTION, SOLUTION INTRAVENOUS at 08:37

## 2022-09-13 RX ADMIN — KETOROLAC TROMETHAMINE 15 MG: 30 INJECTION, SOLUTION INTRAMUSCULAR at 09:22

## 2022-09-13 RX ADMIN — FENTANYL CITRATE 25 MCG: 50 INJECTION, SOLUTION INTRAMUSCULAR; INTRAVENOUS at 10:04

## 2022-09-13 RX ADMIN — DEXAMETHASONE SODIUM PHOSPHATE 4 MG: 4 INJECTION, SOLUTION INTRA-ARTICULAR; INTRALESIONAL; INTRAMUSCULAR; INTRAVENOUS; SOFT TISSUE at 08:06

## 2022-09-13 RX ADMIN — LIDOCAINE HYDROCHLORIDE 20 MG: 10 INJECTION, SOLUTION EPIDURAL; INFILTRATION; INTRACAUDAL; PERINEURAL at 08:06

## 2022-09-13 RX ADMIN — FENTANYL CITRATE 25 MCG: 50 INJECTION, SOLUTION INTRAMUSCULAR; INTRAVENOUS at 09:58

## 2022-09-13 RX ADMIN — SODIUM CHLORIDE, POTASSIUM CHLORIDE, SODIUM LACTATE AND CALCIUM CHLORIDE: 600; 310; 30; 20 INJECTION, SOLUTION INTRAVENOUS at 09:12

## 2022-09-13 RX ADMIN — ONDANSETRON HYDROCHLORIDE 4 MG: 2 INJECTION, SOLUTION INTRAVENOUS at 09:20

## 2022-09-13 RX ADMIN — GLYCOPYRROLATE 0.6 MG: 0.2 INJECTION, SOLUTION INTRAMUSCULAR; INTRAVENOUS at 09:24

## 2022-09-13 RX ADMIN — NEOSTIGMINE METHYLSULFATE 4 MG: 1 INJECTION, SOLUTION INTRAVENOUS at 09:24

## 2022-09-13 RX ADMIN — PHENYLEPHRINE HYDROCHLORIDE 100 MCG: 10 INJECTION INTRAVENOUS at 08:19

## 2022-09-13 RX ADMIN — GLYCOPYRROLATE 0.1 MG: 0.2 INJECTION, SOLUTION INTRAMUSCULAR; INTRAVENOUS at 08:06

## 2022-09-13 RX ADMIN — Medication 2 G: at 07:47

## 2022-09-13 RX ADMIN — HYDROMORPHONE HYDROCHLORIDE 0.5 MG: 1 INJECTION, SOLUTION INTRAMUSCULAR; INTRAVENOUS; SUBCUTANEOUS at 08:23

## 2022-09-13 RX ADMIN — PROPOFOL 150 MG: 10 INJECTION, EMULSION INTRAVENOUS at 08:06

## 2022-09-13 RX ADMIN — HEPARIN SODIUM 5000 UNITS: 10000 INJECTION, SOLUTION INTRAVENOUS; SUBCUTANEOUS at 07:42

## 2022-09-13 RX ADMIN — PHENYLEPHRINE HYDROCHLORIDE 100 MCG: 10 INJECTION INTRAVENOUS at 08:25

## 2022-09-13 RX ADMIN — PHENYLEPHRINE HYDROCHLORIDE 100 MCG: 10 INJECTION INTRAVENOUS at 09:11

## 2022-09-13 RX ADMIN — FENTANYL CITRATE 25 MCG: 50 INJECTION, SOLUTION INTRAMUSCULAR; INTRAVENOUS at 10:15

## 2022-09-13 RX ADMIN — ROCURONIUM BROMIDE 40 MG: 50 INJECTION, SOLUTION INTRAVENOUS at 08:06

## 2022-09-13 RX ADMIN — PROPOFOL 60 MCG/KG/MIN: 10 INJECTION, EMULSION INTRAVENOUS at 08:11

## 2022-09-13 RX ADMIN — FENTANYL CITRATE 100 MCG: 50 INJECTION, SOLUTION INTRAMUSCULAR; INTRAVENOUS at 08:06

## 2022-09-13 RX ADMIN — ACETAMINOPHEN 650 MG: 325 TABLET, FILM COATED ORAL at 10:30

## 2022-09-13 RX ADMIN — OXYCODONE HYDROCHLORIDE 5 MG: 5 TABLET ORAL at 10:30

## 2022-09-13 RX ADMIN — PHENYLEPHRINE HYDROCHLORIDE 100 MCG: 10 INJECTION INTRAVENOUS at 08:46

## 2022-09-13 RX ADMIN — PHENYLEPHRINE HYDROCHLORIDE 100 MCG: 10 INJECTION INTRAVENOUS at 08:58

## 2022-09-13 ASSESSMENT — ACTIVITIES OF DAILY LIVING (ADL)
ADLS_ACUITY_SCORE: 35

## 2022-09-13 ASSESSMENT — ENCOUNTER SYMPTOMS: DYSRHYTHMIAS: 1

## 2022-09-13 NOTE — ANESTHESIA POSTPROCEDURE EVALUATION
Patient: Paty Grajeda    Procedure: Procedure(s):  Xi Robotic Assisted HERNIORRHAPHY, VENTRAL, LAPAROSCOPIC, with mesh       Anesthesia Type:  General    Note:  Disposition: Outpatient   Postop Pain Control: Uneventful            Sign Out: Well controlled pain   PONV: No   Neuro/Psych: Uneventful            Sign Out: Acceptable/Baseline neuro status   Airway/Respiratory: Uneventful            Sign Out: Acceptable/Baseline resp. status   CV/Hemodynamics: Uneventful            Sign Out: Acceptable CV status; No obvious hypovolemia; No obvious fluid overload   Other NRE: NONE   DID A NON-ROUTINE EVENT OCCUR? No           Last vitals:  Vitals Value Taken Time   /78 09/13/22 1032   Temp 97.8  F (36.6  C) 09/13/22 0946   Pulse 61 09/13/22 1036   Resp 14 09/13/22 1036   SpO2 97 % 09/13/22 1040   Vitals shown include unvalidated device data.    Electronically Signed By: Byron Worrell MD  September 13, 2022  2:43 PM

## 2022-09-13 NOTE — BRIEF OP NOTE
[facility] [place]    Brief Operative Note    Pre-operative diagnosis: Spigelian hernia   Post-operative diagnosis Same as pre-operative diagnosis    Procedure: Procedure(s):  Xi Robotic Assisted HERNIORRHAPHY, VENTRAL, LAPAROSCOPIC, with mesh  Surgeon: Surgeon(s) and Role:     * Nithin Hester MD - Primary     * Kurtis Rivero PA-C - Assisting  Anesthesia: General   Estimated Blood Loss: Less than 10 ml    Drains: None  Specimens: * No specimens in log *  Findings:   right spigelian hernia .  Complications: None.  Implants:   Implant Name Type Inv. Item Serial No.  Lot No. LRB No. Used Action   MESH COMPOSITE PARIETENE DS [id number] - [id number] Mesh MESH COMPOSITE PARIETENE DS [id number]  [facility] [id number] N/A 1 Implanted

## 2022-09-13 NOTE — DISCHARGE INSTRUCTIONS
HOME CARE FOLLOWING SPIGELIAN HERNIA REPAIR  YOLETTE Olivia, GREG Vallecillo, LILLIAN Shen    DIET:  Start with liquids and gradually resume your regular diet as tolerated.  Drink plenty of fluids.  While taking pain medications, consider use of a stool softener, increase your fiber in your diet, or add a fiber supplement (like Metamucil, Citrucel) to help prevent constipation - a possible side effect of pain medications.    NAUSEA:  If nauseated from the anesthetic/pain meds; rest in bed, get up cautiously with assistance, and drink clear liquids (juice, tea, broth).    ACTIVITY:  Light Activity -- you may immediately be up and about as tolerated.  Walking is encouraged, increase as tolerated.  Driving/Light Work-- when comfortable and off narcotic pain medications.  Strenuous Work/Activity -- limit lifting to 20 pounds for 3 weeks.  Active Sports (running, biking, etc.) -- cautiously resume after 4 weeks.    INCISIONAL CARE:  If you have a dressing in place, keep clean and dry for 48 hours; you may replace the gauze if it becomes soiled.  After 48 hours you may remove the dressing and shower.  Do not submerse incision in water for 1 week.  Sutures will absorb and need not be removed.  If present, leave the steri-strips (white paper tapes) in place for 14 days after surgery.  Do not apply lotions, creams, or ointments to incisions.  Expect a variable amount of swelling/black and blue discoloration that may involve the penis/scrotum or labia.  Some numbness around the incision is common.  A lump/ridge under the incision is normal and will gradually resolve.    DISCOMFORT:  Local anesthetic placed at surgery should provide relief for 4-8 hours.  Begin taking pain pills before discomfort is severe.  Take the pain medication with some food, when possible, to minimize side effects.  Intermittent use of ice packs to the hernia repair site may help during the first 1-3 weeks after surgery.   Expect gradual improvement.    Over-the-counter anti-inflammatory medications (i.e. Ibuprofen/Advil/Motrin or Naprosyn/Aleve) may be used per package instructions in addition to or while tapering off the narcotic pain medications to decrease swelling and sensitivity at the repair site.  DO NOT TAKE these Anti-inflammatory medications if your primary physician has advised against doing so, or if you have acid reflux, ulcer, or bleeding disorder, or take blood-thinner medications.  Call your primary physician or the surgery office if you have medication questions.    FOLLOW-UP AFTER SURGERY:  -Our office will contact you approximately 2-3 weeks after surgery to check on your progress and answer any questions you may have.  If you are doing well, you will not need to return for an office appointment.  If any concerns are identified over the phone, we will help you make an appointment to see a provider.    -If you have not received a phone call, have any questions or concerns, or would like to be seen, please call us at [phone].  We are located at: [address], [address]; [place] [id number]    -CONTACT US IF THE FOLLOWING DEVELOPS:   1. A fever that is above 101     2. Increased redness, warmth, drainage, bleeding, or swelling.   3. Pain that is not relieved by rest/ice and your prescription.   4.  Increasing pain after 48 hours.   5. Drainage that is thick, cloudy, yellow, green or white.   6. Any other questions or concerns.      FREQUENTLY ASKED QUESTIONS:    Q:  How should my incision look?    A:  Normally your incision will appear slightly swollen with light redness directly along the incision itself as it heals.  It may feel like a bump or ridge as the healing/scarring happens, and over time (3-4 months) this bump or ridge feeling should slowly go away.  In general, clear or pink watery drainage can be normal at first as your incision heals, but should decrease over time.    Q:  How do I know if my  incision is infected?  A:  Look at your incision for signs of infection, like redness around the incision spreading to surrounding skin, or drainage of cloudy or foul-smelling drainage.  If you feel warm, check your temperature to see if you are running a fever.    **If any of these things occur, please notify the nurse at our office.  We may need you to come into the office for an incision check.      Q:  How do I take care of my incision?  A:  If you have a dressing in place - Starting the day after surgery, replace the dressing 1-2 times a day until there is no further drainage from the incision.  At that time, a dressing is no longer needed.  Try to minimize tape on the skin if irritation is occurring at the tape sites.  If you have significant irritation from tape on the skin, please call the office to discuss other method of dressing your incision.    Small pieces of tape called  steri-strips  may be present directly overlying your incision; these may be removed 10 days after surgery unless otherwise specified by your surgeon.  If these tapes start to loosen at the ends, you may trim them back until they fall off or are removed.      Q:  There is a piece of tape or a sticky  lead  still on my skin.  Can I remove this?  A:  Sometimes the sticky  leads  used for monitoring during surgery or for evaluation in the emergency department are not all removed while you are in the hospital.  These sometimes have a tab or metal dot on them.  You can easily remove these on your own, like taking off a band-aid.  If there is a gel substance under the  lead , simply wipe/clean it off with a washcloth or paper towel.      Q:  What can I do to minimize constipation (very hard stools, or lack of stools)?  A:  Stay well hydrated.  Increase your dietary fiber intake or take a fiber supplement -with plenty of water.  Walk around frequently.  You may consider an over-the-counter stool-softener.  Your Pharmacist can assist you with  choosing one that is stocked at your pharmacy.  Constipation is also one of the most common side effects of pain medication.  If you are using pain medication, be pro-active and try to PREVENT problems with constipation by taking the steps above BEFORE constipation becomes a problem.    Q:  What do I do if I need more pain medications?  A:  Call the office to receive refills.  Be aware that certain pain meds cannot be called into a pharmacy and actually require a paper prescription.  A change may be made in your pain med as you progress thru your recovery period or if you have side effects to certain meds.    --Pain meds are NOT refilled after 5pm on weekdays, and NOT AT ALL on the weekends, so please look ahead to prevent problems.    Q:  Why am I having a hard time sleeping now that I am at home?  A:  Many medications you receive while you are in the hospital can impact your sleep for a number of days after your surgery/hospitalization.  Decreased level of activity and naps during the day may also make sleeping at night difficult.  Try to minimize day-time naps, and get up frequently during the day to walk around your home during your recovery time.  Sleep aides may be of some help, but are not recommended for long-term use.      Q:  I am having some back discomfort.  What should I do?  A:  This may be related to certain positioning that was required for your surgery, extended periods of time in bed, or other changes in your overall activity level.  You may try ice, heat, acetaminophen, or ibuprofen to treat this temporarily.  Note that many pain medications have acetaminophen in them and would state this on the prescription bottle.  Be sure not to exceed the maximum of 4000mg per day of acetaminophen.     **If the pain you are having does not resolve, is severe, or is a flare of back pain you have had on other occasions prior to surgery, please contact your primary physician for further recommendations or for an  appointment to be examined at their office.    Q:  Why am I having headaches?  A:  Headaches can be caused by many things:  caffeine withdrawal, use of pain meds, dehydration, high blood pressure, lack of sleep, over-activity/exhaustion, flare-up of usual migraine headaches.  If you feel this is related to muscle tension (a band-like feeling around the head, or a pressure at the low-back of the head) you may try ice or heat to this area.  You may need to drink more fluids (try electrolyte drink like Gatorade), rest, or take your usual migraine medications.   **If your headaches do not resolve, worsen, are accompanied by other symptoms, or if your blood pressure is high, please call your primary physician for recommendation and/or examination.    Q:  I am unable to urinate.  What do I do?  A:  A small percentage of people can have difficulty urinating initially after surgery.  This includes being able to urinate only a very small amount at a time and feeling discomfort or pressure in the very low abdomen.  This is called  urinary retention , and is actually an urgent situation.  Proceed to your nearest Emergency department for evaluation (not an Urgent Care Center).  Sometimes the bladder does not work correctly after certain medications you receive during surgery, or related to certain procedures.  You may need to have a catheter placed until your bladder recovers.  When planning to go to an Emergency department, it may help to call the ER to let them know you are coming in for this problem after a surgery.  This may help you get in quicker to be evaluated.  **If you have symptoms of a urinary tract infection, please contact your primary physician for the proper evaluation and treatment.        If you have other questions, please call the office Monday thru Friday between 8am and 4:30pm to discuss with the nurse or physician assistant.  [phone]    There is a surgeon ON CALL on weekday evenings and over the  weekend in case of urgent need only, and may be contacted at the same number.    If you are having an emergency, call 911 or proceed to your nearest emergency department.    YOU MAY RESUME YOUR WARFARIN TOMORROW (9/14) MORNING     Maximum acetaminophen (Tylenol) dose from all sources should not exceed 4 grams (4000 mg) per day.    You received Toradol, an IV form of Ibuprofen (Motrin) at 9:20am.  Do not take any Ibuprofen products until 3:20pm.

## 2022-09-13 NOTE — ANESTHESIA CARE TRANSFER NOTE
Patient: Paty Grajeda    Procedure: Procedure(s):  Xi Robotic Assisted HERNIORRHAPHY, VENTRAL, LAPAROSCOPIC, with mesh       Diagnosis: Incarcerated hernia [K46.0]  Diagnosis Additional Information: No value filed.    Anesthesia Type:   General     Note:    Oropharynx: oropharynx clear of all foreign objects  Level of Consciousness: awake  Oxygen Supplementation: face mask  Level of Supplemental Oxygen (L/min / FiO2): 6 lpm  Independent Airway: airway patency satisfactory and stable  Dentition: dentition unchanged  Vital Signs Stable: post-procedure vital signs reviewed and stable  Report to RN Given: handoff report given  Patient transferred to: PACU  Comments: Patient oral suctioned. Patient with spontaneous respirations and adequate tidal volumes. Patient awake and responsive. Extubated in OR to 6L facemask. To PACU ventilating well. VSS. Report given.  Handoff Report: Identifed the Patient, Identified the Reponsible Provider, Reviewed the pertinent medical history, Discussed the surgical course, Reviewed Intra-OP anesthesia mangement and issues during anesthesia, Set expectations for post-procedure period and Allowed opportunity for questions and acknowledgement of understanding      Vitals:  Vitals Value Taken Time   /82 09/13/22 0945   Temp     Pulse 70 09/13/22 0949   Resp 12 09/13/22 0949   SpO2 99 % 09/13/22 0949   Vitals shown include unvalidated device data.    Electronically Signed By: AARON Hernandez CRNA  September 13, 2022  9:51 AM

## 2022-09-13 NOTE — OP NOTE
[facility] General Surgery Operative Note    Pre-operative diagnosis: Spigelian hernia     Post-operative diagnosis: Same    Procedure: Robotic assisted right spigelian hernia repair    Surgeon: Nithin Hester MD   Assistant(s): Kurtis Rivero PA-C  The Physician Assistant was medically necessary for their expertise in prepping, camera management, suctioning, suturing and retraction.   Anesthesia: general   Estimated blood loss:  Specimen: 5 cc  None            INDICATION FOR OPERATION: This is a 77 year old female who presented to the ED with an incarcerated right spigelian hernia. It was able to be reduced in the ED but we did recommend repair as this was a hernia with high risk for repeat incarceration. We discussed options for repair in the ED and mutually agreed upon a robotic spigelian hernia repair on a semi-elective basis. We discussed the risks and benefits in the pre-op area and the patient agreed to proceed.     DESCRIPTION OF PROCEDURE:    Paty Grajeda was seen in the preinduction area.  The risk and benefits of the procedure were explained to the patient with the risk including bleeding, infection, injury to adjacent structures or organs, the need for additional procedures, chronic pain, recurrence, and general risks of surgery including DVT, PE, MI, arrhythmia, stroke, pneumonia, respiratory failure.  She understood and gave informed consent to proceed.  The patient was then brought to the operating room and placed on the operating room table in the supine position.  General anesthesia was administered.  The abdomen was then prepped and draped in the usual standard fashion.  A timeout was held.    A 8 mm skin incision was made in the left upper quadrant approximately 2 fingerbreadths below the costal margin.  Access into the abdomen was then obtained under direct visualization using the 5 mm 0 degree laparoscopic camera and the Optiview trocar.  A pneumoperitoneum was established and the  abdomen was surveyed for any injury from our entry and none was seen.  We then placed an 8 mm robotic port in the upper midline and another 8 mm robotic port in the left flank. The 5 mm port in the LUQ was replaced with an 8 mm robotic port. The robot was then docked and I then unscrubbed and went to work at the robotic console.    There was a small (~2x2 cm) spigelian hernia in the right lower quadrant. There were no inguinal, femoral, or other ventral hernias noticed. We started our dissection by making a peritoneal flap several centimeters medial and superior to the hernia. We dissected out the preperitoneal space and reduced the hernia which contained preperitoneal fat only. We then closed the defect with a running 0 Stratafix suture. We then measured the preperitoneal space with a ruler and cut a piece of the DS Parietene mesh to 10x10 cm which allowed for excellent overlap of the defect. The mesh was secured to the abdominal wall with six 2-0 Vicryl sutures placed circumferentially around the mesh. The peritoneal flap was then closed with a running 3-0 V-lock suture. No tears in the peritoneum were seen. The robot was then undocked. The robotic ports were removed and the pneumoperitoneum evacuated. The skin incisions were then closed with 4-0 Vicryl intracuticular suture.     The patient was then woken, extubated, transferred to a transport gurney, and brought to the Postanesthesia Care Unit in satisfactory condition.  Sponge and needle counts were correct on two occasions prior to the completion of the case.    FINDINGS: Right spigelian hernia    Nithin Hester MD

## 2022-09-13 NOTE — ANESTHESIA PREPROCEDURE EVALUATION
Anesthesia Pre-Procedure Evaluation    Patient: Paty Grajeda   MRN: [id number] : 1945        Procedure : Procedure(s):  Xi Robotic Assisted HERNIORRHAPHY, VENTRAL, LAPAROSCOPIC          Past Medical History:   Diagnosis Date     Antiplatelet or antithrombotic long-term use      Arrhythmia     a fib     Benign essential hypertension      Benign positional vertigo     resovled with PT     Herpes keratitis      Hyperlipidemia      Hypothyroidism      Impaired fasting glucose      Peptic ulcer      Stress-induced cardiomyopathy     Resolved, felt due to hypertensive urgency     Thrombocytopenia (H)       Past Surgical History:   Procedure Laterality Date     BREAST BIOPSY, RT/LT       CATARACT IOL, RT/LT       CV CORONARY ANGIOGRAM N/A 2020    Procedure: Coronary Angiogram;  Surgeon: Oni Villela MD;  Location:  HEART CARDI[facility] CATH LAB     CV LEFT HEART CATH N/A 2020    Procedure: Left Heart Cath;  Surgeon: Oni Villela MD;  Location:  HEART CARDIAC CATH LAB     HYSTERECTOMY      partial      Allergies   Allergen Reactions     Lisinopril Other (See Comments)     Tickle in throat     Adhesive Tape Rash      Social History     Tobacco Use     Smoking status: Former Smoker     Quit date:      Years since quittin.7     Smokeless tobacco: Never Used   Substance Use Topics     Alcohol use: Yes     Comment: 1 several times per week      Wt Readings from Last 1 Encounters:   22 76.9 kg (169 lb 9.6 oz)        Anesthesia Evaluation            ROS/MED HX  ENT/Pulmonary:  - neg pulmonary ROS     Neurologic:  - neg neurologic ROS     Cardiovascular:     (+) hypertension-----Taking blood thinners dysrhythmias, a-fib,     METS/Exercise Tolerance:     Hematologic:  - neg hematologic  ROS     Musculoskeletal:  - neg musculoskeletal ROS     GI/Hepatic:  - neg GI/hepatic ROS     Renal/Genitourinary:  - neg Renal ROS     Endo:     (+) thyroid problem, hypothyroidism, Obesity,      Psychiatric/Substance Use:  - neg psychiatric ROS     Infectious Disease:  - neg infectious disease ROS     Malignancy:       Other:  - neg other ROS          Physical Exam    Airway        Mallampati: II   TM distance: > 3 FB   Neck ROM: full   Mouth opening: > 3 cm    Respiratory Devices and Support         Dental  no notable dental history         Cardiovascular             Pulmonary   pulmonary exam normal                OUTSIDE LABS:  CBC:   Lab Results   Component Value Date    WBC 5.8 08/20/2022    WBC 5.8 08/19/2022    HGB 13.2 08/20/2022    HGB 13.6 08/19/2022    HCT 41.8 08/20/2022    HCT 41.6 08/19/2022     (L) 08/20/2022     (L) 08/19/2022     BMP:   Lab Results   Component Value Date     08/20/2022     08/19/2022    POTASSIUM 4.9 08/20/2022    POTASSIUM 3.8 08/19/2022    CHLORIDE 111 (H) 08/20/2022    CHLORIDE 105 08/19/2022    CO2 28 08/20/2022    CO2 26 08/19/2022    BUN 13.2 08/20/2022    BUN 22.2 08/19/2022    CR 0.83 08/20/2022    CR 0.99 (H) 08/19/2022     (H) 08/20/2022     (H) 08/19/2022     COAGS:   Lab Results   Component Value Date    PTT 46 (H) 02/03/2020    INR 2.83 (H) 08/20/2022     POC:   Lab Results   Component Value Date    BGM 95 02/04/2020     HEPATIC:   Lab Results   Component Value Date    ALBUMIN 3.7 08/20/2022    PROTTOTAL 5.7 (L) 08/20/2022    ALT 19 08/20/2022    AST 19 08/20/2022    ALKPHOS 61 08/20/2022    BILITOTAL 0.6 08/20/2022     OTHER:   Lab Results   Component Value Date    LACT 1.5 08/19/2022    A1C 5.9 (H) 02/03/2020    JULIAN 9.0 08/20/2022    TSH 2.66 02/03/2020    T4 1.14 05/02/2016       Anesthesia Plan    ASA Status:  3      Anesthesia Type: General.     - Airway: ETT   Induction: Intravenous.   Maintenance: Balanced.        Consents    Anesthesia Plan(s) and associated risks, benefits, and realistic alternatives discussed. Questions answered and patient/representative(s) expressed understanding.    - Discussed:     -  Discussed with:  Patient      - Extended Intubation/Ventilatory Support Discussed: No.      - Patient is DNR/DNI Status: No    Use of blood products discussed: No .     Postoperative Care    Pain management: IV analgesics, Oral pain medications, Multi-modal analgesia.   PONV prophylaxis: Ondansetron (or other 5HT-3), Dexamethasone or Solumedrol     Comments:                Byron Worrell MD

## 2022-09-13 NOTE — ANESTHESIA PROCEDURE NOTES
Airway       Patient location during procedure: OR       Procedure Start/Stop Times: 9/13/2022 8:08 AM  Staff -        Anesthesiologist:  Byron Worrell MD       CRNA: Dali Hawkins APRN CRNA       Performed By: CRNA  Consent for Airway        Urgency: elective  Indications and Patient Condition       Indications for airway management: tal-procedural       Induction type:intravenous       Mask difficulty assessment: 1 - vent by mask    Final Airway Details       Final airway type: endotracheal airway       Successful airway: ETT - single  Endotracheal Airway Details        ETT size (mm): 7.0       Cuffed: yes       Cuff volume (mL): 5       Successful intubation technique: direct laryngoscopy       DL Blade Type: Bailey 2       Grade View of Cords: 1       Adjucts: stylet       Position: Right       Measured from: lips       Secured at (cm): 21       Bite block used: None    Post intubation assessment        Placement verified by: capnometry, equal breath sounds and chest rise        Number of attempts at approach: 1       Number of other approaches attempted: 0       Secured with: plastic tape       Ease of procedure: easy       Dentition: Intact and Unchanged    Medication(s) Administered   Medication Administration Time: 9/13/2022 8:08 AM

## 2022-09-20 ENCOUNTER — TELEPHONE (OUTPATIENT)
Dept: SURGERY | Facility: CLINIC | Age: 77
End: 2022-09-20

## 2022-09-20 NOTE — TELEPHONE ENCOUNTER
Name of caller: Patient    Reason for Call:  Patient is still having pain, wants to know if this is normal.     Surgeon:  Nithin Hester    Recent Surgery:  Yes.     If yes, when & what type:  Robotic assisted right spigelian hernia repair  9/13/22      Best phone number to reach pt at is: [phone]  Ok to leave a message with medical info? Yes.    Pharmacy preferred (if calling for a refill): N/A

## 2022-09-20 NOTE — TELEPHONE ENCOUNTER
S/p Robotic assisted right spigelian hernia repair   Procedure date: 9/13/22  Surgeon: Dr. Hester    Patient reports she continues with R sided abdominal pain at hernia site - wondering if that is normal at this point in her recovery.  Pain seems to be worse at the end of the day.        She is taking 2 ES Tylenol twice a day for pain.  No longer taking any of the oxycodone  - has 6 tabs left.     She is eating and drinking without nausea or vomiting.      Incisions are without redness, swelling or drainage.  Denies fever .     Reassured patient that some pain / discomfort at one week post-op is expected.      She is ok to take 1 tab oxycodone at HS to help her sleep / rest.      Reminded patient that she can expect a PA from our office to call her to follow up on her recovery 2-3 weeks post-op.     All questions answered, no further needs at this time.

## 2022-10-03 ENCOUNTER — TELEPHONE (OUTPATIENT)
Dept: SURGERY | Facility: CLINIC | Age: 77
End: 2022-10-03
Payer: COMMERCIAL

## 2022-10-03 PROCEDURE — 99024 POSTOP FOLLOW-UP VISIT: CPT | Performed by: PHYSICIAN ASSISTANT

## 2022-10-03 NOTE — TELEPHONE ENCOUNTER
[facility] Postoperative Call Note:     Paty Grajeda was called for an update regarding her recovery. She underwent a robotic-assisted spigelian hernia repair with mesh by Dr. Hester on 9/13/22. Today she tells me she is doing well. She initially had some pain where her hernia was but this has improved quite a bit over the last week or so. She is eating a normal diet and her bowels are regular. She states her incisions are healing well.    Activity and lifting restrictions were reviewed. Patient was instructed to slowly and gradually resume all normal activities.The patient states all of her questions were answered. She understands our discussion. She agrees to follow up as needed or to call our office with any concerns.    Kurtis Rivero PA-C      Please route or send letter to:  Primary Care Provider (PCP)

## 2024-05-06 ENCOUNTER — APPOINTMENT (OUTPATIENT)
Dept: CT IMAGING | Facility: CLINIC | Age: 79
End: 2024-05-06
Attending: EMERGENCY MEDICINE
Payer: COMMERCIAL

## 2024-05-06 ENCOUNTER — HOSPITAL ENCOUNTER (EMERGENCY)
Facility: CLINIC | Age: 79
Discharge: HOME | End: 2024-05-06
Attending: EMERGENCY MEDICINE | Admitting: EMERGENCY MEDICINE
Payer: COMMERCIAL

## 2024-05-06 VITALS
RESPIRATION RATE: 18 BRPM | HEART RATE: 62 BPM | WEIGHT: 158.73 LBS | HEIGHT: 61 IN | BODY MASS INDEX: 29.97 KG/M2 | SYSTOLIC BLOOD PRESSURE: 130 MMHG | TEMPERATURE: 97.7 F | DIASTOLIC BLOOD PRESSURE: 74 MMHG | OXYGEN SATURATION: 99 %

## 2024-05-06 DIAGNOSIS — Z79.01 ANTICOAGULATED: ICD-10-CM

## 2024-05-06 DIAGNOSIS — K52.9 COLITIS: ICD-10-CM

## 2024-05-06 DIAGNOSIS — N39.0 URINARY TRACT INFECTION WITHOUT HEMATURIA, SITE UNSPECIFIED: ICD-10-CM

## 2024-05-06 LAB
ALBUMIN UR-MCNC: 20 MG/DL
ANION GAP SERPL CALCULATED.3IONS-SCNC: 13 MMOL/L (ref 7–15)
APPEARANCE UR: ABNORMAL
BACTERIA #/AREA URNS HPF: ABNORMAL /HPF
BASOPHILS # BLD AUTO: 0 10E3/UL (ref 0–0.2)
BASOPHILS NFR BLD AUTO: 0 %
BILIRUB UR QL STRIP: NEGATIVE
BUN SERPL-MCNC: 21.3 MG/DL (ref 8–23)
CALCIUM SERPL-MCNC: 9.7 MG/DL (ref 8.8–10.2)
CHLORIDE SERPL-SCNC: 103 MMOL/L (ref 98–107)
COLOR UR AUTO: YELLOW
CREAT SERPL-MCNC: 0.96 MG/DL (ref 0.51–0.95)
DEPRECATED HCO3 PLAS-SCNC: 24 MMOL/L (ref 22–29)
EGFRCR SERPLBLD CKD-EPI 2021: 60 ML/MIN/1.73M2
EOSINOPHIL # BLD AUTO: 0.2 10E3/UL (ref 0–0.7)
EOSINOPHIL NFR BLD AUTO: 2 %
ERYTHROCYTE [DISTWIDTH] IN BLOOD BY AUTOMATED COUNT: 13.4 % (ref 10–15)
GLUCOSE SERPL-MCNC: 127 MG/DL (ref 70–99)
GLUCOSE UR STRIP-MCNC: NEGATIVE MG/DL
HCT VFR BLD AUTO: 44.5 % (ref 35–47)
HGB BLD-MCNC: 14.8 G/DL (ref 11.7–15.7)
HGB UR QL STRIP: ABNORMAL
HOLD SPECIMEN: NORMAL
IMM GRANULOCYTES # BLD: 0 10E3/UL
IMM GRANULOCYTES NFR BLD: 0 %
INR PPP: 2.47 (ref 0.85–1.15)
KETONES UR STRIP-MCNC: 20 MG/DL
LEUKOCYTE ESTERASE UR QL STRIP: ABNORMAL
LYMPHOCYTES # BLD AUTO: 0.7 10E3/UL (ref 0.8–5.3)
LYMPHOCYTES NFR BLD AUTO: 10 %
MCH RBC QN AUTO: 31.4 PG (ref 26.5–33)
MCHC RBC AUTO-ENTMCNC: 33.3 G/DL (ref 31.5–36.5)
MCV RBC AUTO: 95 FL (ref 78–100)
MONOCYTES # BLD AUTO: 0.6 10E3/UL (ref 0–1.3)
MONOCYTES NFR BLD AUTO: 9 %
MUCOUS THREADS #/AREA URNS LPF: PRESENT /LPF
NEUTROPHILS # BLD AUTO: 5.9 10E3/UL (ref 1.6–8.3)
NEUTROPHILS NFR BLD AUTO: 79 %
NITRATE UR QL: NEGATIVE
NRBC # BLD AUTO: 0 10E3/UL
NRBC BLD AUTO-RTO: 0 /100
PH UR STRIP: 5.5 [PH] (ref 5–7)
PLATELET # BLD AUTO: 141 10E3/UL (ref 150–450)
POTASSIUM SERPL-SCNC: 4.3 MMOL/L (ref 3.4–5.3)
RBC # BLD AUTO: 4.71 10E6/UL (ref 3.8–5.2)
RBC URINE: 5 /HPF
SODIUM SERPL-SCNC: 140 MMOL/L (ref 135–145)
SP GR UR STRIP: 1.02 (ref 1–1.03)
SQUAMOUS EPITHELIAL: 1 /HPF
UROBILINOGEN UR STRIP-MCNC: NORMAL MG/DL
WBC # BLD AUTO: 7.4 10E3/UL (ref 4–11)
WBC URINE: 85 /HPF

## 2024-05-06 PROCEDURE — 82374 ASSAY BLOOD CARBON DIOXIDE: CPT | Performed by: EMERGENCY MEDICINE

## 2024-05-06 PROCEDURE — 250N000011 HC RX IP 250 OP 636: Performed by: EMERGENCY MEDICINE

## 2024-05-06 PROCEDURE — 85025 COMPLETE CBC W/AUTO DIFF WBC: CPT | Performed by: EMERGENCY MEDICINE

## 2024-05-06 PROCEDURE — 250N000009 HC RX 250: Performed by: EMERGENCY MEDICINE

## 2024-05-06 PROCEDURE — 85610 PROTHROMBIN TIME: CPT | Performed by: EMERGENCY MEDICINE

## 2024-05-06 PROCEDURE — 96365 THER/PROPH/DIAG IV INF INIT: CPT | Mod: 59

## 2024-05-06 PROCEDURE — 87086 URINE CULTURE/COLONY COUNT: CPT | Performed by: EMERGENCY MEDICINE

## 2024-05-06 PROCEDURE — 81001 URINALYSIS AUTO W/SCOPE: CPT | Performed by: EMERGENCY MEDICINE

## 2024-05-06 PROCEDURE — 74177 CT ABD & PELVIS W/CONTRAST: CPT

## 2024-05-06 PROCEDURE — 99285 EMERGENCY DEPT VISIT HI MDM: CPT | Mod: 25

## 2024-05-06 PROCEDURE — 36415 COLL VENOUS BLD VENIPUNCTURE: CPT | Performed by: EMERGENCY MEDICINE

## 2024-05-06 RX ORDER — IOPAMIDOL 755 MG/ML
500 INJECTION, SOLUTION INTRAVASCULAR ONCE
Status: COMPLETED | OUTPATIENT
Start: 2024-05-06 | End: 2024-05-06

## 2024-05-06 RX ORDER — CEFTRIAXONE 1 G/1
1 INJECTION, POWDER, FOR SOLUTION INTRAMUSCULAR; INTRAVENOUS ONCE
Status: COMPLETED | OUTPATIENT
Start: 2024-05-06 | End: 2024-05-06

## 2024-05-06 RX ORDER — CEPHALEXIN 500 MG/1
500 CAPSULE ORAL 4 TIMES DAILY
Qty: 28 CAPSULE | Refills: 0 | Status: SHIPPED | OUTPATIENT
Start: 2024-05-06 | End: 2024-05-13

## 2024-05-06 RX ORDER — ONDANSETRON 4 MG/1
4 TABLET, ORALLY DISINTEGRATING ORAL EVERY 8 HOURS PRN
Qty: 10 TABLET | Refills: 0 | Status: SHIPPED | OUTPATIENT
Start: 2024-05-06 | End: 2024-05-09

## 2024-05-06 RX ADMIN — SODIUM CHLORIDE 60 ML: 9 INJECTION, SOLUTION INTRAVENOUS at 07:55

## 2024-05-06 RX ADMIN — IOPAMIDOL 80 ML: 755 INJECTION, SOLUTION INTRAVENOUS at 07:55

## 2024-05-06 RX ADMIN — CEFTRIAXONE 1 G: 1 INJECTION, POWDER, FOR SOLUTION INTRAMUSCULAR; INTRAVENOUS at 09:16

## 2024-05-06 ASSESSMENT — ACTIVITIES OF DAILY LIVING (ADL)
ADLS_ACUITY_SCORE: 35

## 2024-05-06 ASSESSMENT — COLUMBIA-SUICIDE SEVERITY RATING SCALE - C-SSRS
6. HAVE YOU EVER DONE ANYTHING, STARTED TO DO ANYTHING, OR PREPARED TO DO ANYTHING TO END YOUR LIFE?: NO
2. HAVE YOU ACTUALLY HAD ANY THOUGHTS OF KILLING YOURSELF IN THE PAST MONTH?: NO
1. IN THE PAST MONTH, HAVE YOU WISHED YOU WERE DEAD OR WISHED YOU COULD GO TO SLEEP AND NOT WAKE UP?: NO

## 2024-05-06 NOTE — ED TRIAGE NOTES
Pt presents to triage with c/o blood in urine and rectal bleeding. Symptom onset 2100 5/5/2024. Pt is on blood thinners. Abdominal pain since 1400 yesterday. Bright red blood, no noticed clots. Denies lightheadedness and dizziness.

## 2024-05-06 NOTE — ED NOTES
Pt discharged with written instructions and prescriptions.  Pt verbalizes understanding of follow up if symptoms don't improve or worsen.  No further questions at this time.  Pt and  were able to ambulate to the ED lobby without issue.

## 2024-05-06 NOTE — DISCHARGE INSTRUCTIONS
It was a pleasure taking care of you today. I hope you feel much better soon.  Your workup suggests you have colitis and UTI.    For your colitis, adopt a clear liquid diet and gradually advance to regular over the next few days.  Take Tylenol for pain.  For UTI, take Keflex as prescribed.  Please follow-up with your primary care doctor in 3-5 days for recheck.  Return immediately for worse pain, severe bleeding, lightheadedness, weakness, or any other concerns.

## 2024-05-06 NOTE — ED PROVIDER NOTES
History     Chief Complaint:  Abdominal pain, vomiting and diarrhea, rectal bleeding       HPI   Paty Grajeda is a 78 year old female on Coumadin for A-fib who presents with abdominal pain, vomiting or diarrhea, rectal bleeding, and concern for possible vaginal bleeding.  Patient states that she was in her normal state of health until yesterday at 2 PM when she experienced abdominal pain, nausea, and vomiting.  She denies hematemesis.  Later in the afternoon she experienced additional abdominal cramping and had an episode of bloody diarrhea.  The patient felt fine for some time until 9 PM, and thereafter she is experienced mild suprapubic abdominal cramping and multiple softer bowel movements with some blood.  She is concerned that she may be also having hematuria versus vaginal bleeding, which she notices only when she goes to the bathroom.  She endorses a sense of chills and feeling sweaty during the cramping episodes, which are becoming less frequent.  She denies fevers, dysuria, dizziness, shortness of breath, chest pain, or any other concerns.        Independent Historian:   none    Review of External Notes: Reviewed 6/29/2023 office visit for general review of past medical history    ROS:  Review of Systems    Allergies:  Lisinopril  Adhesive Tape     Medications:    cephALEXin (KEFLEX) 500 MG capsule  ondansetron (ZOFRAN ODT) 4 MG ODT tab  acyclovir (ZOVIRAX) 400 MG tablet  amLODIPine (NORVASC) 5 MG tablet  atorvastatin (LIPITOR) 40 MG tablet  Calcium Carb-Cholecalciferol (CALCIUM CARBONATE-VITAMIN D3) 600-400 MG-UNIT TABS  clobetasol (TEMOVATE) 0.05 % external ointment  Ferrous Sulfate 324 (65 Fe) MG TBEC  fluticasone (FLONASE) 50 MCG/ACT nasal spray  levothyroxine (SYNTHROID/LEVOTHROID) 75 MCG tablet  losartan (COZAAR) 100 MG tablet  metoprolol succinate ER (TOPROL-XL) 25 MG 24 hr tablet  prednisoLONE acetate (PRED FORTE) 1 % ophthalmic suspension  Vitamin D, Cholecalciferol, 50 MCG (2000 UT)  CAPS  warfarin ANTICOAGULANT (COUMADIN) 5 MG tablet        Past History:    Past Medical History:   Diagnosis Date    Atrial fibrillation     Benign essential hypertension     Benign positional vertigo     Herpes keratitis     Hyperlipidemia     Hypothyroidism     Impaired fasting glucose     Peptic ulcer     Stress-induced cardiomyopathy     Thrombocytopenia          Physical Exam     Patient Vitals for the past 24 hrs:   BP Temp Temp src Pulse Resp SpO2   05/12/23 0247 113/73 97.8  F (36.6  C) Temporal 95 20 98 %        Physical Exam  Constitutional: Alert, attentive  HENT:    Nose: Nose normal.    Mouth/Throat: Oropharynx is clear, mucous membranes are moist   Eyes: EOM are normal.   CV: regular rate and rhythm; no murmurs, rubs or gallups  Chest: Effort normal and breath sounds normal.   GI:  There is suprapubic tenderness. No distension. Normal bowel sounds  MSK: Normal range of motion.   Neurological: Alert, attentive  Skin: Skin is warm and dry.      Emergency Department Course       Results for orders placed or performed during the hospital encounter of 05/06/24   CT Abdomen Pelvis w Contrast     Status: None    Narrative    CT ABDOMEN PELVIS W CONTRAST 5/6/2024 8:03 AM    CLINICAL HISTORY: abd pain, vomiting, diarrhea, hematochezia,  anticoagulated    TECHNIQUE: CT scan of the abdomen and pelvis was performed following  injection of IV contrast. Multiplanar reformats were obtained. Dose  reduction techniques were used.  CONTRAST: 80mL Isovue-370    COMPARISON: 8/19/2022, 2/3/2020    FINDINGS:   LOWER CHEST: A 3 mm right lower lobe subpleural nodule (series 3,  image 1), not well-visualized on prior CTs, likely due to motion on  those studies.    HEPATOBILIARY: Stable 2.8 cm hemangioma along the posterior right lobe  (3/56) and 2.3 cm hemangioma in the inferior right lobe of the liver  (3/70). A few other small cysts and hemangiomas are also stable. No  suspicious lesions in the liver. No calcified  gallstones or biliary  ductal dilatation.    PANCREAS: Normal.    SPLEEN: Normal.    ADRENAL GLANDS: A 2.7 x 2.2 cm nodule in the left adrenal gland is  stable since 2/3/2020. This is likely a benign adenoma given  stability. Stable calcifications in the right adrenal gland, likely  the sequela of old infection or trauma.    KIDNEYS/BLADDER: Parapelvic cysts in the kidneys requiring no specific  follow-up. No calculi or hydronephrosis bilaterally. Decompressed  urinary bladder. Stable calcification or phlebolith along the right  lateral bladder wall measuring 8 mm (3/177).    BOWEL: Marked circumferential wall thickening of the left half of the  transverse colon, splenic flexure, descending colon and sigmoid colon,  consistent with acute colitis. There are a few scattered diverticula  in the sigmoid colon but the inflammation is not centered on the  diverticuli. No small bowel or colonic obstruction. Normal appendix.  Marked circumferential wall thickening of the gastric antrum.    PELVIC ORGANS: Hysterectomy. No pelvic masses.    ADDITIONAL FINDINGS: No free air. No free fluid or fluid collections.  No abdominal aortic aneurysm. No lymphadenopathy.    MUSCULOSKELETAL: Unremarkable. Right-sided pelvic stimulator.      Impression    IMPRESSION:   1.  Findings consistent with acute colitis involving the left half of  the transverse colon, descending colon and sigmoid colon. This may be  infectious, inflammatory or ischemic.  2.  Marked circumferential wall thickening of the gastric antrum.  Follow-up endoscopy is suggested to exclude underlying mass or ulcer.  3.  Stable left adrenal nodule, which should be benign given the  stability.  4.  A 3 mm subpleural nodule in the right lower lobe. Consider  follow-up chest CT in one year.    ELLYN LEE MD         SYSTEM ID:  [id number]   [place] Draw     Status: None    Narrative    The following orders were created for panel order [place] [facility].  Procedure                                Abnormality         Status                     ---------                               -----------         ------                     Extra Blue Top Tube[[id number]]                              Final result               Extra Red Top Tube[[id number]]                               Final result               Extra Green Top (Lithium...[[id number]]                      Final result               Extra Purple Top Tube[[id number]]                            Final result               Extra Blood Bank Purple ...[[id number]]                      Final result               Extra Blood Bank Purple ...[[id number]]                      Final result                 Please view results for these tests on the individual orders.   Extra Blue Top Tube     Status: None   Result Value Ref Range    Hold Specimen JIC    Extra Red Top Tube     Status: None   Result Value Ref Range    Hold Specimen JIC    Extra Green Top (Lithium Heparin) Tube     Status: None   Result Value Ref Range    Hold Specimen JIC    Extra Purple Top Tube     Status: None   Result Value Ref Range    Hold Specimen JIC    Extra Blood Bank Purple Top Tube     Status: None   Result Value Ref Range    Hold Specimen JIC    Extra Blood Bank Purple Top Tube     Status: None   Result Value Ref Range    Hold Specimen JIC    Basic metabolic panel (BMP)     Status: Abnormal   Result Value Ref Range    Sodium 140 135 - 145 mmol/L    Potassium 4.3 3.4 - 5.3 mmol/L    Chloride 103 98 - 107 mmol/L    Carbon Dioxide (CO2) 24 22 - 29 mmol/L    Anion Gap 13 7 - 15 mmol/L    Urea Nitrogen 21.3 8.0 - 23.0 mg/dL    Creatinine 0.96 (H) 0.51 - 0.95 mg/dL    GFR Estimate 60 (L) >60 mL/min/1.73m2    Calcium 9.7 8.8 - 10.2 mg/dL    Glucose 127 (H) 70 - 99 mg/dL   UA with Microscopic     Status: Abnormal   Result Value Ref Range    Color Urine Yellow Colorless, Straw, Light Yellow, Yellow    Appearance Urine Slightly Cloudy (A) Clear    Glucose Urine Negative Negative mg/dL     Bilirubin Urine Negative Negative    Ketones Urine 20 (A) Negative mg/dL    Specific Gravity Urine 1.024 1.003 - 1.035    Blood Urine Trace (A) Negative    pH Urine 5.5 5.0 - 7.0    Protein Albumin Urine 20 (A) Negative mg/dL    Urobilinogen Urine Normal Normal, 2.0 mg/dL    Nitrite Urine Negative Negative    Leukocyte Esterase Urine Large (A) Negative    Bacteria Urine Many (A) None Seen /HPF    Mucus Urine Present (A) None Seen /LPF    RBC Urine 5 (H) <=2 /HPF    WBC Urine 85 (H) <=5 /HPF    Squamous Epithelials Urine 1 <=1 /HPF   INR     Status: Abnormal   Result Value Ref Range    INR 2.47 (H) 0.85 - 1.15   CBC with platelets and differential     Status: Abnormal   Result Value Ref Range    WBC Count 7.4 4.0 - 11.0 10e3/uL    RBC Count 4.71 3.80 - 5.20 10e6/uL    Hemoglobin 14.8 11.7 - 15.7 g/dL    Hematocrit 44.5 35.0 - 47.0 %    MCV 95 78 - 100 fL    MCH 31.4 26.5 - 33.0 pg    MCHC 33.3 31.5 - 36.5 g/dL    RDW 13.4 10.0 - 15.0 %    Platelet Count 141 (L) 150 - 450 10e3/uL    % Neutrophils 79 %    % Lymphocytes 10 %    % Monocytes 9 %    % Eosinophils 2 %    % Basophils 0 %    % Immature Granulocytes 0 %    NRBCs per 100 WBC 0 <1 /100    Absolute Neutrophils 5.9 1.6 - 8.3 10e3/uL    Absolute Lymphocytes 0.7 (L) 0.8 - 5.3 10e3/uL    Absolute Monocytes 0.6 0.0 - 1.3 10e3/uL    Absolute Eosinophils 0.2 0.0 - 0.7 10e3/uL    Absolute Basophils 0.0 0.0 - 0.2 10e3/uL    Absolute Immature Granulocytes 0.0 <=0.4 10e3/uL    Absolute NRBCs 0.0 10e3/uL   CBC + differential     Status: Abnormal    Narrative    The following orders were created for panel order CBC + differential.  Procedure                               Abnormality         Status                     ---------                               -----------         ------                     CBC with platelets and d...[[id number]]  Abnormal            Final result                 Please view results for these tests on the individual orders.       Emergency  Department Course & Assessments:             Interventions:  Medications   CT Saline Flush (60 mLs Intravenous $Given 5/6/24 8771)   iopamidol (ISOVUE-370) solution 500 mL (80 mLs Intravenous $Given 5/6/24 1645)   cefTRIAXone (ROCEPHIN) 1 g vial to attach to  mL bag for ADULTS or NS 50 mL bag for PEDS (0 g Intravenous Stopped 5/6/24 0910)       Disposition:  The patient was discharged to home.     Impression & Plan        Medical Decision Making:  This is a 78-year-old female with history of anticoagulation on Coumadin who presents for evaluation of vomiting and diarrhea with some observation of hematochezia associated with loose stools.  Symptoms are nearly resolved upon presentation, she has a benign exam, and is tolerating p.o.'s without difficulty.  However, given abdominal pain and advanced age, CT abdomen pelvis performed.  CT shows colitis.  There is no anemia or other significant abnormality noted on blood work.  UA is suggestive of UTI.  Will plan for ceftriaxone in the department and observation for any further GI symptoms.  Following completion of ceftriaxone, the patient remains asymptomatic.  Plan Keflex as an outpatient, culture urine, and primary care follow-up in 3 to 5 days.  Return precautions for worse pain, intractable vomiting, severe bleeding, or any other concerns.      Diagnosis:  Visit Diagnosis, Associated Orders, and Comments     ICD-10-CM    1. Colitis  K52.9       2. Urinary tract infection without hematuria, site unspecified  N39.0       3. Anticoagulated  Z79.01            You Montiel MD  05/06/24 1534

## 2024-05-07 LAB — BACTERIA UR CULT: NORMAL

## (undated) DEVICE — BLADE KNIFE SURG 11 371111

## (undated) DEVICE — DEFIB PRO-PADZ LVP LQD GEL ADULT 8900-2105-01

## (undated) DEVICE — FASTENER CATH BALLOON CLAMPX2 STATLOCK 0684-00-493

## (undated) DEVICE — MANIFOLD KIT ANGIO AUTOMATED 014613

## (undated) DEVICE — KIT HAND CONTROL ANGIOTOUCH ACIST 65CM AT-P65

## (undated) DEVICE — PACK MINOR CUSTOM RIDGES SBA32RMRMA

## (undated) DEVICE — RAD INFLATOR BASIC COMPAK  IN4130

## (undated) DEVICE — CATH ANGIO INFINITI MPA2 4FRX100CM 2 SH 538442

## (undated) DEVICE — DRAPE LAP W/ARMBOARD 29410

## (undated) DEVICE — DAVINCI XI SEAL UNIVERSAL 5-8MM 470361

## (undated) DEVICE — VALVE HEMOSTASIS .096" COPILOT MECH 1003331

## (undated) DEVICE — PREP CHLORAPREP 26ML TINTED HI-LITE ORANGE 930815

## (undated) DEVICE — SU VICRYL 3-0 RB-1 27" UND J215H

## (undated) DEVICE — SU VICRYL 4-0 PS-2 18" UND J496H

## (undated) DEVICE — ESU GROUND PAD ADULT W/CORD E7507

## (undated) DEVICE — DAVINCI XI DRAPE COLUMN 470341

## (undated) DEVICE — LIGHT HANDLE X2

## (undated) DEVICE — SYSTEM LAPAROVUE VISIBILITY LAPVUE10

## (undated) DEVICE — LUBRICANT INST ELECTROLUBE EL101

## (undated) DEVICE — SU VICRYL 2-0 SH 27" J317H

## (undated) DEVICE — CATH ANGIO JUDKINS JL4 6FRX100CM INFINITI 534620T

## (undated) DEVICE — SU STRATAFIX PDS PLUS 0 CT-2 18" SXPP1A407

## (undated) DEVICE — CATH ANGIO INFINITI 3DRC 4FRX100CM 538476

## (undated) DEVICE — LINEN DRAPE 54X72" 5467

## (undated) DEVICE — DAVINCI XI ESU FORCE BIPOLAR 8MM 471405

## (undated) DEVICE — GLOVE PROTEXIS BLUE W/NEU-THERA 8.0  2D73EB80

## (undated) DEVICE — GUIDEWIRE VASC 0.035INX150CM INQWIRE J TIP IQ35F150J3F/A

## (undated) DEVICE — CATH DIAG 4FR JL 4.5 538417

## (undated) DEVICE — DAVINCI XI OBTURATOR BLADELESS 8MM 470359

## (undated) DEVICE — INTRODUCER SHEATH GREEN 6.5FRX11CM .038IN PSI-6F-11-038ACT

## (undated) DEVICE — SOL WATER IRRIG 1000ML BOTTLE 2F7114

## (undated) DEVICE — LINEN ORTHO ACL PACK 5447

## (undated) DEVICE — KIT LG BORE TOUHY ACCESS PLUS MAP152

## (undated) DEVICE — DAVINCI XI DRAPE ARM 470015

## (undated) DEVICE — CATH ANGIO INFINITI 3DRC 6FRX100CM 534676T

## (undated) DEVICE — GLOVE PROTEXIS MICRO 7.5  2D73PM75

## (undated) DEVICE — DAVINCI HOT SHEARS TIP COVER  400180

## (undated) DEVICE — CATH DIAG 4FR ANG PIG 538453S

## (undated) DEVICE — GUIDEWIRE VASC 0.014INX180CM RUNTHROUGH 25-1011

## (undated) DEVICE — ENDO TROCAR FIRST ENTRY KII FIOS Z-THRD 05X100MM CTF03

## (undated) DEVICE — INTRO SHEATH 4FRX10CM PINNACLE RSS402

## (undated) DEVICE — DRSG STERI STRIP 1/2X4" R1547

## (undated) DEVICE — SU WND CLOSURE VLOC 90 ABS 3-0 VIOLET 6" CV-23 VLOCM0804

## (undated) DEVICE — SU STRATAFIX PDS PLUS 0 CT-2 9" SXPP1A446

## (undated) RX ORDER — GLYCOPYRROLATE 0.2 MG/ML
INJECTION INTRAMUSCULAR; INTRAVENOUS
Status: DISPENSED
Start: 2022-09-13

## (undated) RX ORDER — HEPARIN SODIUM 5000 [USP'U]/.5ML
INJECTION, SOLUTION INTRAVENOUS; SUBCUTANEOUS
Status: DISPENSED
Start: 2022-09-13

## (undated) RX ORDER — FENTANYL CITRATE 50 UG/ML
INJECTION, SOLUTION INTRAMUSCULAR; INTRAVENOUS
Status: DISPENSED
Start: 2022-09-13

## (undated) RX ORDER — PROPOFOL 10 MG/ML
INJECTION, EMULSION INTRAVENOUS
Status: DISPENSED
Start: 2022-09-13

## (undated) RX ORDER — KETOROLAC TROMETHAMINE 30 MG/ML
INJECTION, SOLUTION INTRAMUSCULAR; INTRAVENOUS
Status: DISPENSED
Start: 2022-09-13

## (undated) RX ORDER — CEFAZOLIN SODIUM/WATER 2 G/20 ML
SYRINGE (ML) INTRAVENOUS
Status: DISPENSED
Start: 2022-09-13

## (undated) RX ORDER — FENTANYL CITRATE-0.9 % NACL/PF 10 MCG/ML
PLASTIC BAG, INJECTION (ML) INTRAVENOUS
Status: DISPENSED
Start: 2022-09-13

## (undated) RX ORDER — NEOSTIGMINE METHYLSULFATE 1 MG/ML
VIAL (ML) INJECTION
Status: DISPENSED
Start: 2022-09-13

## (undated) RX ORDER — ONDANSETRON 2 MG/ML
INJECTION INTRAMUSCULAR; INTRAVENOUS
Status: DISPENSED
Start: 2022-09-13

## (undated) RX ORDER — BUPIVACAINE HYDROCHLORIDE AND EPINEPHRINE 2.5; 5 MG/ML; UG/ML
INJECTION, SOLUTION EPIDURAL; INFILTRATION; INTRACAUDAL; PERINEURAL
Status: DISPENSED
Start: 2022-09-13

## (undated) RX ORDER — OXYCODONE HYDROCHLORIDE 5 MG/1
TABLET ORAL
Status: DISPENSED
Start: 2022-09-13

## (undated) RX ORDER — ACETAMINOPHEN 325 MG/1
TABLET ORAL
Status: DISPENSED
Start: 2022-09-13

## (undated) RX ORDER — LIDOCAINE HYDROCHLORIDE 10 MG/ML
INJECTION, SOLUTION EPIDURAL; INFILTRATION; INTRACAUDAL; PERINEURAL
Status: DISPENSED
Start: 2022-09-13

## (undated) RX ORDER — DEXAMETHASONE SODIUM PHOSPHATE 4 MG/ML
INJECTION, SOLUTION INTRA-ARTICULAR; INTRALESIONAL; INTRAMUSCULAR; INTRAVENOUS; SOFT TISSUE
Status: DISPENSED
Start: 2022-09-13